# Patient Record
Sex: FEMALE | Race: OTHER | HISPANIC OR LATINO
[De-identification: names, ages, dates, MRNs, and addresses within clinical notes are randomized per-mention and may not be internally consistent; named-entity substitution may affect disease eponyms.]

---

## 2020-05-13 DIAGNOSIS — Z78.9 OTHER SPECIFIED HEALTH STATUS: ICD-10-CM

## 2020-05-13 RX ORDER — LEVONORGESTREL / ETHINYL ESTRADIOL AND ETHINYL ESTRADIOL 150-30(84)
0.15-0.03 &0.01 KIT ORAL
Refills: 0 | Status: ACTIVE | COMMUNITY

## 2020-05-15 ENCOUNTER — APPOINTMENT (OUTPATIENT)
Dept: CARDIOLOGY | Facility: CLINIC | Age: 20
End: 2020-05-15
Payer: MEDICAID

## 2020-05-15 ENCOUNTER — NON-APPOINTMENT (OUTPATIENT)
Age: 20
End: 2020-05-15

## 2020-05-15 VITALS
BODY MASS INDEX: 21.66 KG/M2 | WEIGHT: 138 LBS | HEIGHT: 67 IN | SYSTOLIC BLOOD PRESSURE: 142 MMHG | TEMPERATURE: 98.2 F | DIASTOLIC BLOOD PRESSURE: 70 MMHG

## 2020-05-15 VITALS
TEMPERATURE: 98.2 F | WEIGHT: 138 LBS | HEIGHT: 67 IN | OXYGEN SATURATION: 100 % | DIASTOLIC BLOOD PRESSURE: 68 MMHG | SYSTOLIC BLOOD PRESSURE: 142 MMHG | HEART RATE: 80 BPM | BODY MASS INDEX: 21.66 KG/M2

## 2020-05-15 VITALS — SYSTOLIC BLOOD PRESSURE: 130 MMHG | DIASTOLIC BLOOD PRESSURE: 78 MMHG

## 2020-05-15 DIAGNOSIS — Z78.9 OTHER SPECIFIED HEALTH STATUS: ICD-10-CM

## 2020-05-15 PROCEDURE — 99244 OFF/OP CNSLTJ NEW/EST MOD 40: CPT | Mod: 25

## 2020-05-15 PROCEDURE — 0296T: CPT

## 2020-05-15 PROCEDURE — 93000 ELECTROCARDIOGRAM COMPLETE: CPT | Mod: 59

## 2020-05-15 NOTE — REVIEW OF SYSTEMS
[see HPI] : see HPI [Palpitations] : palpitations [Negative] : Endocrine [Shortness Of Breath] : no shortness of breath [Chest  Pressure] : no chest pressure [Dyspnea on exertion] : not dyspnea during exertion [Lower Ext Edema] : no extremity edema [Leg Claudication] : no intermittent leg claudication [Chest Pain] : no chest pain

## 2020-05-15 NOTE — PHYSICAL EXAM
[General Appearance - Well Developed] : well developed [Well Groomed] : well groomed [Normal Appearance] : normal appearance [No Deformities] : no deformities [General Appearance - Well Nourished] : well nourished [General Appearance - In No Acute Distress] : no acute distress [Normal Conjunctiva] : the conjunctiva exhibited no abnormalities [Normal Oral Mucosa] : normal oral mucosa [Eyelids - No Xanthelasma] : the eyelids demonstrated no xanthelasmas [No Oral Cyanosis] : no oral cyanosis [No Oral Pallor] : no oral pallor [Normal Jugular Venous V Waves Present] : normal jugular venous V waves present [Normal Jugular Venous A Waves Present] : normal jugular venous A waves present [No Jugular Venous Guo A Waves] : no jugular venous guo A waves [Normal] : normal [No Precordial Heave] : no precordial heave was noted [Normal Rate] : normal [Normal S1] : normal S1 [Normal S2] : normal S2 [No Murmur] : no murmurs heard [2+] : right 2+ [No Abnormalities] : the abdominal aorta was not enlarged and no bruit was heard [No Pitting Edema] : no pitting edema present [Exaggerated Use Of Accessory Muscles For Inspiration] : no accessory muscle use [Respiration, Rhythm And Depth] : normal respiratory rhythm and effort [Abdomen Soft] : soft [Auscultation Breath Sounds / Voice Sounds] : lungs were clear to auscultation bilaterally [Abdomen Tenderness] : non-tender [Abdomen Mass (___ Cm)] : no abdominal mass palpated [Abnormal Walk] : normal gait [Nail Clubbing] : no clubbing of the fingernails [Gait - Sufficient For Exercise Testing] : the gait was sufficient for exercise testing [Cyanosis, Localized] : no localized cyanosis [Petechial Hemorrhages (___cm)] : no petechial hemorrhages [Skin Color & Pigmentation] : normal skin color and pigmentation [No Venous Stasis] : no venous stasis [] : no rash [No Xanthoma] : no  xanthoma was observed [No Skin Ulcers] : no skin ulcer [Skin Lesions] : no skin lesions [Oriented To Time, Place, And Person] : oriented to person, place, and time [Mood] : the mood was normal [Affect] : the affect was normal [No Anxiety] : not feeling anxious [FreeTextEntry1] : Equal pulses and blood pressure bilaterally [S3] : no S3 [Right Femoral Bruit] : no bruit heard over the right femoral artery [Right Carotid Bruit] : no bruit heard over the right carotid [Left Carotid Bruit] : no bruit heard over the left carotid [S4] : no S4 [Left Femoral Bruit] : no bruit heard over the left femoral artery

## 2020-05-15 NOTE — DISCUSSION/SUMMARY
[FreeTextEntry1] : 19-year-old female with history of childhood murmur and seen pediatric cardiologist about 3 years ago with exercise treadmill stress test.  No further follow-up was advised.  Family history of hypertension on maternal side but no history of sudden cardiac death or premature coronary artery disease.  According to her she gets anxious when she goes to doctors offices and has borderline blood pressure usually rest of the time blood pressure is less than 130/80.\par Comes in now with intermittent palpitations.  EKG with normal sinus rhythm.  Normal intervals.\par She had recent labs with your office which are not available\par I do not have cardiac evaluation with pediatric cardiologist available for me to review\par Her physical examination is normal.\par Her repeat blood pressure was around 130/80.\par \par Recommendation includes\par 24hour cardiac monitoring\par Echocardiogram for LV ejection fraction and valvular evaluation\par Obtain labs and prior cardiac work-up\par Low caffeine and alcohol intake\par Follow blood pressure closely if remains greater than 130/80 on multiple occasions may need to discuss risk and benefits of using birth control pills\par Lifestyle modification with low-salt diet regular activity exercise\par Stress management\par \par Counseling regarding low saturated fat, salt and carbohydrate intake was reviewed. Active lifestyle and regular. Exercise along with weight management is advised.\par All the above were at length reviewed. Answered all the questions. Thank you very much for this kind referral. Please do not hesitate to give me a call for any question.\par Part of this transcription was done with voice recognition software and phonetically similar errors are common. I apologize for that. Please do not hesitate to call for any questions due to above.\par

## 2020-05-15 NOTE — REASON FOR VISIT
[Consultation] : a consultation regarding [Palpitations] : palpitations [FreeTextEntry1] : 19-year-old young female was seen in the office for intermittent palpitation got worse around the time of her exam when she was stressed and also not sleeping well with continued aggressive exercises.\par The palpitations were intermittent.  Skipping in nature.  No associated dizziness lightheadedness syncope.  No associated chest pain shortness breath PND orthopnea.  No headache visual disturbances focal weakness.\par No increase in caffeine or alcohol intake.\par She is on birth control pills\par Her symptoms have improved since her exams are over and her stress is decreased.\par \par Her medical history mainly significant for\par She has been seen by pediatric cardiologist for childhood murmur and was told it is all normal and no further follow-up was advised\par She had exercise treadmill stress test with pediatric cardiologist 3 years ago which was told to be negative.\par She has family history with mother side essential hypertension\par She has no family history of sudden cardiac death, premature coronary artery disease\par She is non-smoker\par She has no history of hypertension diabetes mellitus myocardial infarction coronary artery cerebrovascular accident peripheral arterial disease rheumatic fever thyroid or Lyme disease

## 2020-05-22 ENCOUNTER — APPOINTMENT (OUTPATIENT)
Dept: CARDIOLOGY | Facility: CLINIC | Age: 20
End: 2020-05-22
Payer: MEDICAID

## 2020-05-22 PROCEDURE — 93306 TTE W/DOPPLER COMPLETE: CPT

## 2020-05-28 PROCEDURE — 0298T: CPT

## 2020-06-08 ENCOUNTER — APPOINTMENT (OUTPATIENT)
Dept: CARDIOLOGY | Facility: CLINIC | Age: 20
End: 2020-06-08
Payer: MEDICAID

## 2020-06-08 VITALS — HEIGHT: 67 IN | BODY MASS INDEX: 21.66 KG/M2 | WEIGHT: 138 LBS

## 2020-06-08 PROCEDURE — 99214 OFFICE O/P EST MOD 30 MIN: CPT | Mod: 95

## 2020-06-08 NOTE — PHYSICAL EXAM
[General Appearance - Well Developed] : well developed [Normal Appearance] : normal appearance [General Appearance - Well Nourished] : well nourished [Well Groomed] : well groomed [No Deformities] : no deformities [FreeTextEntry1] : Equal pulses and blood pressure bilaterally [General Appearance - In No Acute Distress] : no acute distress [] : no respiratory distress [Respiration, Rhythm And Depth] : normal respiratory rhythm and effort [Auscultation Breath Sounds / Voice Sounds] : lungs were clear to auscultation bilaterally [Exaggerated Use Of Accessory Muscles For Inspiration] : no accessory muscle use [Oriented To Time, Place, And Person] : oriented to person, place, and time [Mood] : the mood was normal [Affect] : the affect was normal [No Anxiety] : not feeling anxious

## 2020-06-08 NOTE — ASSESSMENT
[FreeTextEntry1] : Reviewed on June 8, 2020.\par Echocardiogram May 22, 2020.  EF around 60%.  Minimal mitral regurgitation normal left atrium normal pulmonary pressures\par 24-hour event monitoring/Holter monitor showed single PAC otherwise normal sinus rhythm\par Labs in the past and normal TSH\par Prior cardiovascular test which includes echocardiogram, exercise treadmill stress test from 2019 were reviewed\par

## 2020-06-08 NOTE — DISCUSSION/SUMMARY
[FreeTextEntry1] : 19-year-old female with above medical history and active medical problems\par Palpitations related to premature atrial beats.  Stable LV ejection fraction.  Nonexertional.  Stable EKG.  Recent stress test nonischemic and without any arrhythmias.  Stable labs.  Improved symptoms.\par Reviewed with her lifestyle modifications risk and benefits of use of medications like beta-blockers.\par Options reviewed.  She has decided against taking any medications.  She will contact us immediately for any change in her symptoms.\par Low caffeine and alcohol intake\par Follow blood pressure closely if remains greater than 130/80 on multiple occasions may need to discuss risk and benefits of using birth control pills.  She will follow her blood pressure with her primary care physician.\par Lifestyle modification with low-salt diet regular activity exercise\par Stress management\par \par Counseling regarding low saturated fat, salt and carbohydrate intake was reviewed. Active lifestyle and regular. Exercise along with weight management is advised.\par All the above were at length reviewed. Answered all the questions. Thank you very much for this kind referral. Please do not hesitate to give me a call for any question.\par Part of this transcription was done with voice recognition software and phonetically similar errors are common. I apologize for that. Please do not hesitate to call for any questions due to above.\par

## 2020-06-08 NOTE — HISTORY OF PRESENT ILLNESS
[Home] : at home, [unfilled] , at the time of the visit. [Medical Office: ([facility])___] : at the medical office located in  [FreeTextEntry1] : Consent: Patient consented to virtual video visit.\par Time: A total of 25 minutes was spent in review of pertinent medical records, discussion with the patient, evaluation of the patient problem and coordination of the care plan as part of this online visit.\par \par No physical examination was performed as this visit was performed virtually with exceptions as noted below.\par as I judged the risk of COVID-19 cross-contamination to be greater than benefit to the patient conferred by the physical examination.\par \par 19-year-old was seen in 2 way audiovisual visit.\par I have reviewed her Holter monitor, echocardiogram, labs.\par I have reviewed all data from her pediatric cardiologist\par Since last seen with less stress she has occasional palpitations.  Otherwise no chest pain shortness breath PND orthopnea dizziness lightheadedness near syncopal or syncopal event\par She feels well according to her. [Verbal consent obtained from patient] : the patient, [unfilled]

## 2020-08-26 ENCOUNTER — APPOINTMENT (OUTPATIENT)
Dept: CARDIOLOGY | Facility: CLINIC | Age: 20
End: 2020-08-26
Payer: MEDICAID

## 2020-08-26 VITALS
DIASTOLIC BLOOD PRESSURE: 62 MMHG | HEART RATE: 95 BPM | BODY MASS INDEX: 21.5 KG/M2 | WEIGHT: 137 LBS | TEMPERATURE: 98.4 F | SYSTOLIC BLOOD PRESSURE: 120 MMHG | OXYGEN SATURATION: 99 % | HEIGHT: 67 IN

## 2020-08-26 PROCEDURE — 0296T: CPT

## 2020-08-26 PROCEDURE — 99214 OFFICE O/P EST MOD 30 MIN: CPT

## 2020-08-26 RX ORDER — IBUPROFEN 600 MG/1
600 TABLET, FILM COATED ORAL
Qty: 20 | Refills: 0 | Status: DISCONTINUED | COMMUNITY
Start: 2020-03-06 | End: 2020-08-26

## 2020-08-26 RX ORDER — OXYCODONE AND ACETAMINOPHEN 5; 325 MG/1; MG/1
5-325 TABLET ORAL
Qty: 8 | Refills: 0 | Status: DISCONTINUED | COMMUNITY
Start: 2020-03-06 | End: 2020-08-26

## 2020-08-26 RX ORDER — AMOXICILLIN 500 MG/1
500 CAPSULE ORAL
Qty: 21 | Refills: 0 | Status: DISCONTINUED | COMMUNITY
Start: 2020-03-06 | End: 2020-08-26

## 2020-08-26 NOTE — PHYSICAL EXAM
[Normal Appearance] : normal appearance [General Appearance - Well Developed] : well developed [Well Groomed] : well groomed [No Deformities] : no deformities [General Appearance - Well Nourished] : well nourished [General Appearance - In No Acute Distress] : no acute distress [Normal Conjunctiva] : the conjunctiva exhibited no abnormalities [Normal Oral Mucosa] : normal oral mucosa [Eyelids - No Xanthelasma] : the eyelids demonstrated no xanthelasmas [No Oral Pallor] : no oral pallor [No Oral Cyanosis] : no oral cyanosis [Normal Jugular Venous A Waves Present] : normal jugular venous A waves present [No Jugular Venous Guo A Waves] : no jugular venous guo A waves [Normal Jugular Venous V Waves Present] : normal jugular venous V waves present [Exaggerated Use Of Accessory Muscles For Inspiration] : no accessory muscle use [Respiration, Rhythm And Depth] : normal respiratory rhythm and effort [Auscultation Breath Sounds / Voice Sounds] : lungs were clear to auscultation bilaterally [Abdomen Soft] : soft [Abdomen Tenderness] : non-tender [Abdomen Mass (___ Cm)] : no abdominal mass palpated [Abnormal Walk] : normal gait [Nail Clubbing] : no clubbing of the fingernails [Gait - Sufficient For Exercise Testing] : the gait was sufficient for exercise testing [Cyanosis, Localized] : no localized cyanosis [Petechial Hemorrhages (___cm)] : no petechial hemorrhages [Skin Color & Pigmentation] : normal skin color and pigmentation [] : no rash [No Venous Stasis] : no venous stasis [Skin Lesions] : no skin lesions [No Skin Ulcers] : no skin ulcer [No Xanthoma] : no  xanthoma was observed [Oriented To Time, Place, And Person] : oriented to person, place, and time [Affect] : the affect was normal [Mood] : the mood was normal [Normal] : normal [No Anxiety] : not feeling anxious [No Precordial Heave] : no precordial heave was noted [Normal S1] : normal S1 [Normal Rate] : normal [Normal S2] : normal S2 [No Murmur] : no murmurs heard [2+] : left 2+ [No Abnormalities] : the abdominal aorta was not enlarged and no bruit was heard [No Pitting Edema] : no pitting edema present [FreeTextEntry1] : Equal pulses and blood pressure bilaterally [S3] : no S3 [S4] : no S4 [Right Carotid Bruit] : no bruit heard over the right carotid [Left Carotid Bruit] : no bruit heard over the left carotid [Right Femoral Bruit] : no bruit heard over the right femoral artery [Left Femoral Bruit] : no bruit heard over the left femoral artery

## 2020-08-26 NOTE — DISCUSSION/SUMMARY
[FreeTextEntry1] : KAELA ZENDEJAS  is a 19 year F  who presents today Aug 26, 2020 with the above history and the following active issues. \par \par Palpitations. Intermittent in nature. Prior cardiovascular testing reviewed and listed above. Recommend 2 week ZIO monitor. Avoid stimulants. Continue to exercise. \par Low caffeine and alcohol intake\par Follow blood pressure closely if remains greater than 130/80 on multiple occasions may need to discuss risk and benefits of using birth control pills\par Lifestyle modification with low-salt diet regular activity exercise\par Stress management\par \par Red flag symptoms which would warrant sooner emergent evaluation reviewed with the patient. \par Questions and concerns were addressed and answered. \par \par Sincerely,\par \par Hanna Yoder PA-C\par Patients history, testing and plan reviewed with supervising MD: Dr. Patrick Valentino

## 2020-08-26 NOTE — REASON FOR VISIT
[Palpitations] : palpitations [Consultation] : a consultation regarding [FreeTextEntry1] : 19-year-old young female was seen in the office for intermittent palpitations. She feels that the palpitations have continued to occur and are bothersome. Palpitations get worse around the time of her being stressed and also not sleeping well. Palpitations are described as a skipping in nature.  No associated dizziness lightheadedness syncope.  No associated chest pain shortness breath PND orthopnea.  No headache visual disturbances focal weakness.\par No increase in caffeine or alcohol intake.\par She is on birth control pills\par Her symptoms have improved since her exams are over and her stress is decreased.\par \par Her medical history mainly significant for\par She has been seen by pediatric cardiologist for childhood murmur and was told it is all normal and no further follow-up was advised\par She had exercise treadmill stress test with pediatric cardiologist 3 years ago which was told to be negative.\par She has family history with mother side essential hypertension\par She has no family history of sudden cardiac death, premature coronary artery disease\par She is non-smoker\par She has no history of hypertension diabetes mellitus myocardial infarction coronary artery cerebrovascular accident peripheral arterial disease rheumatic fever thyroid or Lyme disease\par \par Echocardiogram May 22, 2020. EF around 60%. Minimal mitral regurgitation normal left atrium normal pulmonary pressures\par 24-hour event monitoring/Holter monitor showed single PAC otherwise normal sinus rhythm\par Labs in the past and normal TSH\par Prior cardiovascular test which includes echocardiogram, exercise treadmill stress test from 2019 were reviewed\par

## 2020-08-26 NOTE — REVIEW OF SYSTEMS
[see HPI] : see HPI [Palpitations] : palpitations [Negative] : Heme/Lymph [Shortness Of Breath] : no shortness of breath [Dyspnea on exertion] : not dyspnea during exertion [Chest  Pressure] : no chest pressure [Chest Pain] : no chest pain [Lower Ext Edema] : no extremity edema [Leg Claudication] : no intermittent leg claudication

## 2020-09-21 ENCOUNTER — APPOINTMENT (OUTPATIENT)
Dept: CARDIOLOGY | Facility: CLINIC | Age: 20
End: 2020-09-21
Payer: MEDICAID

## 2020-09-21 VITALS
SYSTOLIC BLOOD PRESSURE: 100 MMHG | BODY MASS INDEX: 21.19 KG/M2 | WEIGHT: 135 LBS | HEIGHT: 67 IN | DIASTOLIC BLOOD PRESSURE: 68 MMHG

## 2020-09-21 PROCEDURE — 99442: CPT

## 2020-09-21 NOTE — HISTORY OF PRESENT ILLNESS
[FreeTextEntry1] : Consent: Patient consented to 2 way audio visit.  She was at home.  I was at [facility] office.\par Time: A total of 10 minutes was spent in review of pertinent medical records, discussion with the patient, evaluation of the patient problem and coordination of the care plan as part of this online visit.\par as I judged the risk of COVID-19 cross-contamination to be greater than benefit to the patient conferred by the physical examination.\par 19-year-old was seen in 2 way audio visit.\par I have reviewed her Holter monitor and labs.\par Since last seen by me it appears she had increasing palpitations.  She had 2 weeks event monitoring which was reviewed today.\par According to her with this palpitations which are intermittent.  Not associated with any symptoms.  No other aggravating or relieving factors.  Lasts for a few hours and then does not appear for many days.\par  no chest pain shortness breath PND orthopnea dizziness lightheadedness near syncopal or syncopal event\par She continued to be aggressive with her activity and exercise without any significant symptoms.

## 2020-09-21 NOTE — DISCUSSION/SUMMARY
[FreeTextEntry1] : 19-year-old female with above medical history and active medical problems\par Intermittent palpitations.  Associated with premature atrial ventricular beats.  Stable LV ejection fraction.  Nonischemic stress test in the recent past.  Good exercise program without any associated symptoms.  No associated symptoms with her premature atrial ventricular beats.  It is not quality of life altering according to her.  Occurs randomly and does not have it for many days together.\par \par Reviewed with her lifestyle modifications.  risk and benefits of use of medications like beta-blockers.\par Options reviewed.  She has decided against taking any medications.  She will contact us immediately for any change in her symptoms.\par Low caffeine and alcohol intake\par Follow blood pressure closely if remains greater than 130/80 on multiple occasions may need to discuss risk and benefits of using birth control pills.  She will follow her blood pressure with her primary care physician.\par Lifestyle modification with low-salt diet regular activity exercise\par \par Counseling regarding low saturated fat, salt and carbohydrate intake was reviewed. Active lifestyle and regular. Exercise along with weight management is advised.\par All the above were at length reviewed. Answered all the questions. Thank you very much for this kind referral. Please do not hesitate to give me a call for any question.\par Part of this transcription was done with voice recognition software and phonetically similar errors are common. I apologize for that. Please do not hesitate to call for any questions due to above.\par

## 2020-09-21 NOTE — ASSESSMENT
[FreeTextEntry1] : Reviewed on June 8, 2020.\par Echocardiogram May 22, 2020.  EF around 60%.  Minimal mitral regurgitation normal left atrium normal pulmonary pressures\par 24-hour event monitoring/Holter monitor showed single PAC otherwise normal sinus rhythm\par Labs in the past and normal TSH\par Prior cardiovascular test which includes echocardiogram, exercise treadmill stress test from 2019 were reviewed\par \par Reviewed on September 21, 2020\par Recent TSH August 28, 2020 normal\par Minimum heart rate 41 maximum heart rate 169 average heart rate 78.  Normal sinus rhythm.  Occasional second-degree AV type I.  Isolated PACs PVCs noted.  Occasional ventricular couplet.  Symptoms were associated with premature atrial ventricular activities.

## 2020-09-25 PROCEDURE — 0298T: CPT

## 2022-02-15 ENCOUNTER — APPOINTMENT (OUTPATIENT)
Dept: CARDIOLOGY | Facility: CLINIC | Age: 22
End: 2022-02-15
Payer: MEDICAID

## 2022-02-15 ENCOUNTER — NON-APPOINTMENT (OUTPATIENT)
Age: 22
End: 2022-02-15

## 2022-02-15 VITALS
SYSTOLIC BLOOD PRESSURE: 122 MMHG | HEART RATE: 94 BPM | DIASTOLIC BLOOD PRESSURE: 64 MMHG | HEIGHT: 67 IN | WEIGHT: 152 LBS | BODY MASS INDEX: 23.86 KG/M2 | OXYGEN SATURATION: 99 %

## 2022-02-15 DIAGNOSIS — Z00.00 ENCOUNTER FOR GENERAL ADULT MEDICAL EXAMINATION W/OUT ABNORMAL FINDINGS: ICD-10-CM

## 2022-02-15 PROCEDURE — 99214 OFFICE O/P EST MOD 30 MIN: CPT | Mod: 25

## 2022-02-15 PROCEDURE — 93242 EXT ECG>48HR<7D RECORDING: CPT

## 2022-02-15 PROCEDURE — 93000 ELECTROCARDIOGRAM COMPLETE: CPT

## 2022-02-15 PROCEDURE — 99072 ADDL SUPL MATRL&STAF TM PHE: CPT

## 2022-02-15 RX ORDER — ONABOTULINUMTOXINA 100 [USP'U]/1
INJECTION, POWDER, LYOPHILIZED, FOR SOLUTION INTRADERMAL; INTRAMUSCULAR
Refills: 0 | Status: ACTIVE | COMMUNITY

## 2022-02-15 NOTE — REVIEW OF SYSTEMS
[Negative] : Heme/Lymph [SOB] : no shortness of breath [Dyspnea on exertion] : not dyspnea during exertion [Chest Discomfort] : no chest discomfort [Lower Ext Edema] : no extremity edema [Leg Claudication] : no intermittent leg claudication [Palpitations] : palpitations [Orthopnea] : no orthopnea [PND] : no PND [Syncope] : no syncope [Anxiety] : anxiety [Under Stress] : under stress

## 2022-02-15 NOTE — DISCUSSION/SUMMARY
[FreeTextEntry1] : 21-year-old female with above medical history and active medical problems\par Intermittent palpitations.  Associated with premature atrial ventricular beats.  Stable LV ejection fraction.  Nonischemic stress test in the recent past.  Good exercise program without any associated symptoms.  Nonspecific abnormality of the EKG.  Worsening symptoms.  Possible thyroid disorder.  Anxiety and stress playing a role.\par Recommended 7-day event monitor.\par Recommended echocardiogram to evaluate LV ejection fraction wall motion left atrial size mitral regurgitation again considering worsening symptoms.\par Stress management\par Labs which includes thyroid profile.  And management as needed\par \par \par Reviewed with her lifestyle modifications.  risk and benefits of use of medications like beta-blockers.\par Options reviewed.  She has decided against taking any medications.  She will contact us immediately for any change in her symptoms.\par Low caffeine and alcohol intake\par Follow blood pressure closely if remains greater than 130/80 on multiple occasions may need to discuss risk and benefits of using birth control pills.  She will follow her blood pressure with her primary care physician.\par Lifestyle modification with low-salt diet regular activity exercise\par \par Counseling regarding low saturated fat, salt and carbohydrate intake was reviewed. Active lifestyle and regular. Exercise along with weight management is advised.\par All the above were at length reviewed. Answered all the questions. Thank you very much for this kind referral. Please do not hesitate to give me a call for any question.\par Part of this transcription was done with voice recognition software and phonetically similar errors are common. I apologize for that. Please do not hesitate to call for any questions due to above.\par

## 2022-02-15 NOTE — CARDIOLOGY SUMMARY
[___] : [unfilled] [LVEF ___%] : LVEF [unfilled]% [de-identified] : February 15, 2022.  Normal sinus rhythm left atrial abnormality.  Nonspecific T wave changes. [de-identified] : Event monitor.  2020.  2 weeks.  Premature atrial ventricular beats.  Second-degree AV block.  Mobitz 1.  Junctional escape beats. [de-identified] : 2019.  Normal exercise stress test. [de-identified] : May 22, 2022 EF 60% mild mitral regurgitation.  Normal left atrium.

## 2022-02-15 NOTE — PHYSICAL EXAM
[Well Developed] : well developed [Well Nourished] : well nourished [No Acute Distress] : no acute distress [Normal Conjunctiva] : normal conjunctiva [Normal Venous Pressure] : normal venous pressure [No Carotid Bruit] : no carotid bruit [Normal S1, S2] : normal S1, S2 [No Murmur] : no murmur [No Rub] : no rub [No Gallop] : no gallop [Clear Lung Fields] : clear lung fields [Good Air Entry] : good air entry [No Respiratory Distress] : no respiratory distress  [Normal Gait] : normal gait [No Edema] : no edema [No Cyanosis] : no cyanosis [No Clubbing] : no clubbing [No Varicosities] : no varicosities [Moves all extremities] : moves all extremities [Normal Speech] : normal speech [Alert and Oriented] : alert and oriented [Normal memory] : normal memory [No Focal Deficits] : no focal deficits

## 2022-02-15 NOTE — ASSESSMENT
[FreeTextEntry1] : Reviewed on June 8, 2020.\par Echocardiogram May 22, 2020.  EF around 60%.  Minimal mitral regurgitation normal left atrium normal pulmonary pressures\par 24-hour event monitoring/Holter monitor showed single PAC otherwise normal sinus rhythm\par Labs in the past and normal TSH\par Prior cardiovascular test which includes echocardiogram, exercise treadmill stress test from 2019 were reviewed\par \par Reviewed on September 21, 2020\par Recent TSH August 28, 2020 normal\par Minimum heart rate 41 maximum heart rate 169 average heart rate 78.  Normal sinus rhythm.  Occasional second-degree AV type I.  Isolated PACs PVCs noted.  Occasional ventricular couplet.  Symptoms were associated with premature atrial ventricular activities.\par \par Reviewed on February 15, 2022.\par EKG, prior echocardiogram Holter monitor and last labs were reviewed.

## 2022-03-04 PROCEDURE — 93244 EXT ECG>48HR<7D REV&INTERPJ: CPT

## 2022-03-04 PROCEDURE — 99072 ADDL SUPL MATRL&STAF TM PHE: CPT

## 2022-03-15 ENCOUNTER — APPOINTMENT (OUTPATIENT)
Dept: CARDIOLOGY | Facility: CLINIC | Age: 22
End: 2022-03-15
Payer: MEDICAID

## 2022-03-15 VITALS
WEIGHT: 150 LBS | SYSTOLIC BLOOD PRESSURE: 108 MMHG | HEIGHT: 67 IN | DIASTOLIC BLOOD PRESSURE: 60 MMHG | OXYGEN SATURATION: 97 % | HEART RATE: 80 BPM | BODY MASS INDEX: 23.54 KG/M2

## 2022-03-15 PROCEDURE — 93306 TTE W/DOPPLER COMPLETE: CPT

## 2022-03-15 PROCEDURE — 99214 OFFICE O/P EST MOD 30 MIN: CPT

## 2022-03-15 PROCEDURE — 99072 ADDL SUPL MATRL&STAF TM PHE: CPT

## 2022-03-15 NOTE — REVIEW OF SYSTEMS
[Palpitations] : palpitations [Anxiety] : anxiety [Under Stress] : under stress [Negative] : Heme/Lymph [SOB] : no shortness of breath [Dyspnea on exertion] : not dyspnea during exertion [Chest Discomfort] : no chest discomfort [Lower Ext Edema] : no extremity edema [Leg Claudication] : no intermittent leg claudication [Orthopnea] : no orthopnea [PND] : no PND [Syncope] : no syncope

## 2022-03-15 NOTE — HISTORY OF PRESENT ILLNESS
[FreeTextEntry1] : KAELA ZENDEJAS  is a 21 year F  who presents today Mar 15, 2022 in clinical follow-up and to review recent testing. She continues to have palpitations although they have improved with her increasing her exercise. Episodes are short in duration and lasting only seconds. "Feels like I have an extra beat". No associating symptoms. Good exercise capacity. Symptoms have been ongoing for approx 2 years. Increase with anxiety and stress. Recently told of an intermittent thyroid disorder which is monitored by PCP.\par \par Today she denies chest pain, pressure, unusual shortness of breath, lightheadedness, dizziness, near syncope or syncope. \par Her weight diet appetite sleep is stable\par No increase in caffeine alcohol or smoking history.\par \par There is no prior history of myocardial infarction, coronary revascularization, history of ischemic heart disease, or symptomatic congestive heart failure. There is no history of symptomatic arrhythmias including atrial fibrillation.

## 2022-03-15 NOTE — CARDIOLOGY SUMMARY
[___] : [unfilled] [LVEF ___%] : LVEF [unfilled]% [de-identified] : February 15, 2022.  Normal sinus rhythm left atrial abnormality.  Nonspecific T wave changes. [de-identified] : [facility] 7/15/2022 SR 51-164bpm, rare ectopy, PVC burden <1%\par \par Event monitor.  2020.  2 weeks.  Premature atrial ventricular beats.  Second-degree AV block.  Mobitz 1.  Junctional escape beats. [de-identified] : 2019.  Normal exercise stress test. [de-identified] : May 22, 2020 EF 60% mild mitral regurgitation.  Normal left atrium.

## 2022-03-15 NOTE — CARDIOLOGY SUMMARY
[___] : [unfilled] [LVEF ___%] : LVEF [unfilled]% [de-identified] : February 15, 2022.  Normal sinus rhythm left atrial abnormality.  Nonspecific T wave changes. [de-identified] : [facility] 7/15/2022 SR 51-164bpm, rare ectopy, PVC burden <1%\par \par Event monitor.  2020.  2 weeks.  Premature atrial ventricular beats.  Second-degree AV block.  Mobitz 1.  Junctional escape beats. [de-identified] : 2019.  Normal exercise stress test. [de-identified] : May 22, 2020 EF 60% mild mitral regurgitation.  Normal left atrium.

## 2022-03-15 NOTE — PHYSICAL EXAM
[Well Developed] : well developed [Well Nourished] : well nourished [No Acute Distress] : no acute distress [Normal Conjunctiva] : normal conjunctiva [Normal Venous Pressure] : normal venous pressure [No Carotid Bruit] : no carotid bruit [Normal S1, S2] : normal S1, S2 [No Murmur] : no murmur [No Rub] : no rub [No Gallop] : no gallop [Clear Lung Fields] : clear lung fields [Good Air Entry] : good air entry [No Respiratory Distress] : no respiratory distress  [Normal Gait] : normal gait [No Edema] : no edema [No Cyanosis] : no cyanosis [No Clubbing] : no clubbing [No Varicosities] : no varicosities [Moves all extremities] : moves all extremities [No Focal Deficits] : no focal deficits [Normal Speech] : normal speech [Alert and Oriented] : alert and oriented [Normal memory] : normal memory

## 2022-03-15 NOTE — DISCUSSION/SUMMARY
[FreeTextEntry1] : KAELA ZENDEJAS  is a 21 year F  who presents today Mar 15, 2022 with the above history and the following active issues. \par \par Intermittent palpitations.  Occasionally associated with premature atrial ventricular beats.  Stable LV ejection fraction.  Nonischemic stress test in the recent past.  Good exercise program without any associated symptoms. Continued increased exercise program.  \par Stress management. Reviewed role of beta blocker therapy and at this time patient declines. \par Avoid stimulants. Contact the office with worsening symptoms.\par Low caffeine and alcohol intake\par \par Lifestyle modification with low-salt diet regular activity exercise\par \par Red flag symptoms which would warrant sooner emergent evaluation reviewed with the patient. \par Questions and concerns were addressed and answered. \par Limitations of non-invasive testing reviewed\par \par Sincerely,\par \par Hanna Yoder PA-C\par Patients history, testing and plan reviewed with supervising MD: Dr. Aniket Botello

## 2023-03-10 ENCOUNTER — TRANSCRIPTION ENCOUNTER (OUTPATIENT)
Age: 23
End: 2023-03-10

## 2023-05-02 NOTE — PHYSICAL EXAM
[Well Developed] : well developed [Well Nourished] : well nourished [No Acute Distress] : no acute distress [Normal Venous Pressure] : normal venous pressure [No Carotid Bruit] : no carotid bruit [Normal S1, S2] : normal S1, S2 [No Murmur] : no murmur [No Rub] : no rub [No Gallop] : no gallop [Clear Lung Fields] : clear lung fields [Good Air Entry] : good air entry [No Respiratory Distress] : no respiratory distress  [Normal Gait] : normal gait [No Edema] : no edema [No Cyanosis] : no cyanosis [No Clubbing] : no clubbing [No Varicosities] : no varicosities [Moves all extremities] : moves all extremities [Normal Speech] : normal speech [Alert and Oriented] : alert and oriented

## 2023-05-03 ENCOUNTER — APPOINTMENT (OUTPATIENT)
Dept: CARDIOLOGY | Facility: CLINIC | Age: 23
End: 2023-05-03
Payer: MEDICAID

## 2023-05-03 ENCOUNTER — NON-APPOINTMENT (OUTPATIENT)
Age: 23
End: 2023-05-03

## 2023-05-03 VITALS
SYSTOLIC BLOOD PRESSURE: 102 MMHG | HEIGHT: 67 IN | DIASTOLIC BLOOD PRESSURE: 62 MMHG | BODY MASS INDEX: 22.91 KG/M2 | HEART RATE: 82 BPM | OXYGEN SATURATION: 99 % | WEIGHT: 146 LBS

## 2023-05-03 PROCEDURE — 99214 OFFICE O/P EST MOD 30 MIN: CPT | Mod: 25

## 2023-05-03 PROCEDURE — 93000 ELECTROCARDIOGRAM COMPLETE: CPT

## 2023-05-03 NOTE — DISCUSSION/SUMMARY
[FreeTextEntry1] : KAELA ZENDEJAS  is a 22 year F  who presents today  with the above history and the following active issues. \par \par Intermittent palpitations.  Occasionally associated with premature atrial ventricular beats.  Stable LV ejection fraction.  Nonischemic stress test in the recent past.  Good exercise program without any associated symptoms. Continued increased exercise program.  \par Stress management.  Considering asymptomatic state with lifestyle modification no need for any medications at present.\par Avoid stimulants. Contact the office with worsening symptoms.\par Low caffeine and alcohol intake\par \par Lifestyle modification with low-salt diet regular activity exercise\par \par Red flag symptoms which would warrant sooner emergent evaluation reviewed with the patient. \par Questions and concerns were addressed and answered. \par Counseling regarding low saturated fat, salt and carbohydrate intake was reviewed. Active lifestyle and regular. Exercise along with weight management is advised.\par All the above were at length reviewed. Answered all the questions. Thank you very much for this kind referral. Please do not hesitate to give me a call for any question.\par Part of this transcription was done with voice recognition software and phonetically similar errors are common. I apologize for that. Please do not hesitate to call for any questions due to above.\par \par Follow-up as needed\par Sincerely, \par Aniket Botello MD,FACC,ANNA\par \par

## 2023-05-03 NOTE — REVIEW OF SYSTEMS
[Anxiety] : anxiety [Under Stress] : under stress [Negative] : Heme/Lymph [SOB] : no shortness of breath [Dyspnea on exertion] : not dyspnea during exertion [Chest Discomfort] : no chest discomfort [Lower Ext Edema] : no extremity edema [Leg Claudication] : no intermittent leg claudication [Palpitations] : no palpitations [Orthopnea] : no orthopnea [PND] : no PND [Syncope] : no syncope

## 2023-05-03 NOTE — ASSESSMENT
[FreeTextEntry1] : \par Reviewed May 3, 2023.  Labs from 4/21/2023 were reviewed.  Lipid panel not available.

## 2023-05-03 NOTE — CARDIOLOGY SUMMARY
[___] : [unfilled] [LVEF ___%] : LVEF [unfilled]% [de-identified] : February 15, 2022.  Normal sinus rhythm left atrial abnormality.  Nonspecific T wave changes.\par May 3, 2023 normal sinus rhythm [de-identified] : [facility] 7/15/2022 SR 51-164bpm, rare ectopy, PVC burden <1%\par \par Event monitor.  2020.  2 weeks.  Premature atrial ventricular beats.  Second-degree AV block.  Mobitz 1.  Junctional escape beats. [de-identified] : 2019.  Normal exercise stress test. [de-identified] : May 22, 2020 EF 60% mild mitral regurgitation.  Normal left atrium.

## 2023-05-03 NOTE — HISTORY OF PRESENT ILLNESS
[FreeTextEntry1] : KAELA ZENDEJAS  is a 22 year F  who presents today May 3, 2023.\par \par Today she denies chest pain, pressure, unusual shortness of breath, lightheadedness, dizziness, near syncope or syncope. \par Her weight diet appetite sleep is stable\par No increase in caffeine alcohol or smoking history.\par Continue to do aggressive exercises.  Interval training.\par \par \par There is no prior history of myocardial infarction, coronary revascularization, history of ischemic heart disease, or symptomatic congestive heart failure. There is no history of symptomatic arrhythmias including atrial fibrillation.

## 2023-10-20 ENCOUNTER — APPOINTMENT (OUTPATIENT)
Dept: CARDIOLOGY | Facility: CLINIC | Age: 23
End: 2023-10-20
Payer: MEDICAID

## 2023-10-20 VITALS
HEART RATE: 84 BPM | BODY MASS INDEX: 23.54 KG/M2 | DIASTOLIC BLOOD PRESSURE: 68 MMHG | HEIGHT: 67 IN | SYSTOLIC BLOOD PRESSURE: 114 MMHG | WEIGHT: 150 LBS | OXYGEN SATURATION: 99 %

## 2023-10-20 PROCEDURE — 99215 OFFICE O/P EST HI 40 MIN: CPT

## 2023-10-20 RX ORDER — MULTIVITAMIN
CAPSULE ORAL DAILY
Refills: 0 | Status: DISCONTINUED | COMMUNITY
End: 2023-10-20

## 2023-11-27 ENCOUNTER — APPOINTMENT (OUTPATIENT)
Dept: CARDIOLOGY | Facility: CLINIC | Age: 23
End: 2023-11-27
Payer: MEDICAID

## 2023-11-27 PROCEDURE — 93306 TTE W/DOPPLER COMPLETE: CPT

## 2023-12-03 ENCOUNTER — NON-APPOINTMENT (OUTPATIENT)
Age: 23
End: 2023-12-03

## 2023-12-05 ENCOUNTER — APPOINTMENT (OUTPATIENT)
Dept: CARDIOLOGY | Facility: CLINIC | Age: 23
End: 2023-12-05

## 2023-12-19 ENCOUNTER — APPOINTMENT (OUTPATIENT)
Dept: CARDIOLOGY | Facility: CLINIC | Age: 23
End: 2023-12-19
Payer: MEDICAID

## 2023-12-19 VITALS
BODY MASS INDEX: 23.23 KG/M2 | SYSTOLIC BLOOD PRESSURE: 110 MMHG | HEIGHT: 67 IN | WEIGHT: 148 LBS | OXYGEN SATURATION: 99 % | DIASTOLIC BLOOD PRESSURE: 60 MMHG | HEART RATE: 81 BPM

## 2023-12-19 DIAGNOSIS — I49.1 ATRIAL PREMATURE DEPOLARIZATION: ICD-10-CM

## 2023-12-19 DIAGNOSIS — I49.49 OTHER PREMATURE DEPOLARIZATION: ICD-10-CM

## 2023-12-19 PROCEDURE — 99214 OFFICE O/P EST MOD 30 MIN: CPT

## 2023-12-19 RX ORDER — METFORMIN HYDROCHLORIDE 1000 MG/1
1000 TABLET, COATED ORAL DAILY
Refills: 0 | Status: ACTIVE | COMMUNITY

## 2023-12-19 RX ORDER — LORAZEPAM 1 MG/1
1 TABLET ORAL
Refills: 0 | Status: DISCONTINUED | COMMUNITY
End: 2023-12-19
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
## 2023-12-19 NOTE — REVIEW OF SYSTEMS
[Anxiety] : anxiety [Under Stress] : under stress [Negative] : Heme/Lymph [SOB] : no shortness of breath [Dyspnea on exertion] : not dyspnea during exertion [Chest Discomfort] : no chest discomfort [Lower Ext Edema] : no extremity edema [Leg Claudication] : no intermittent leg claudication [Palpitations] : no palpitations [Orthopnea] : no orthopnea [PND] : no PND [Syncope] : no syncope [FreeTextEntry5] : As noted in HPI

## 2023-12-19 NOTE — CARDIOLOGY SUMMARY
[___] : [unfilled] [LVEF ___%] : LVEF [unfilled]% [de-identified] : February 15, 2022.  Normal sinus rhythm left atrial abnormality.  Nonspecific T wave changes. May 3, 2023 normal sinus rhythm October 14, 2023.  Normal sinus rhythm normal intervals.  Outside EKG. [de-identified] : Event monitor.  November 27, 2023.  Normal sinus rhythm.  Average 85.  Rare ectopic beats.   [facility] 7/15/2022 SR 51-164bpm, rare ectopy, PVC burden <1% Event monitor.  2020.  2 weeks.  Premature atrial ventricular beats.  Second-degree AV block.  Mobitz 1.  Junctional escape beats. [de-identified] : 2019.  Normal exercise stress test. [de-identified] : May 22, 2020 EF 60% mild mitral regurgitation.  Normal left atrium. November 27, 2023.  LVEF 60%.
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
## 2024-02-01 ENCOUNTER — OFFICE (OUTPATIENT)
Dept: URBAN - METROPOLITAN AREA CLINIC 8 | Facility: CLINIC | Age: 24
Setting detail: OPHTHALMOLOGY
End: 2024-02-01
Payer: MEDICAID

## 2024-02-01 DIAGNOSIS — H35.413: ICD-10-CM

## 2024-02-01 DIAGNOSIS — D35.2: ICD-10-CM

## 2024-02-01 PROBLEM — H16.223 DRY EYE SYNDROME K SICCA; BOTH EYES: Status: ACTIVE | Noted: 2024-02-01

## 2024-02-01 PROCEDURE — 92202 OPSCPY EXTND ON/MAC DRAW: CPT | Performed by: OPHTHALMOLOGY

## 2024-02-01 PROCEDURE — 92083 EXTENDED VISUAL FIELD XM: CPT | Performed by: OPHTHALMOLOGY

## 2024-02-01 PROCEDURE — 99204 OFFICE O/P NEW MOD 45 MIN: CPT | Performed by: OPHTHALMOLOGY

## 2024-02-01 PROCEDURE — 92133 CPTRZD OPH DX IMG PST SGM ON: CPT | Performed by: OPHTHALMOLOGY

## 2024-02-01 ASSESSMENT — CONFRONTATIONAL VISUAL FIELD TEST (CVF)
OD_FINDINGS: FULL
OS_FINDINGS: FULL

## 2024-02-01 ASSESSMENT — REFRACTION_MANIFEST
OU_VA: 20/20
OD_VA1: 20/20-1
OD_AXIS: 160
OS_VA1: 20/20-1
OD_SPHERE: -2.25
OS_AXIS: 020
OS_SPHERE: -2.75
OD_CYLINDER: -1.25
OS_CYLINDER: -1.00

## 2024-02-01 ASSESSMENT — REFRACTION_AUTOREFRACTION
OD_SPHERE: -1.75
OD_AXIS: 163
OS_AXIS: 018
OS_CYLINDER: -1.00
OS_SPHERE: -2.50
OD_CYLINDER: -1.25

## 2024-02-01 ASSESSMENT — SPHEQUIV_DERIVED
OD_SPHEQUIV: -2.375
OD_SPHEQUIV: -2.875
OS_SPHEQUIV: -3
OS_SPHEQUIV: -3.25

## 2024-02-01 ASSESSMENT — SUPERFICIAL PUNCTATE KERATITIS (SPK)
OS_SPK: 1+
OD_SPK: 1+

## 2024-02-14 ENCOUNTER — APPOINTMENT (OUTPATIENT)
Dept: CARDIOLOGY | Facility: CLINIC | Age: 24
End: 2024-02-14

## 2024-02-28 ENCOUNTER — APPOINTMENT (OUTPATIENT)
Dept: CARDIOLOGY | Facility: CLINIC | Age: 24
End: 2024-02-28
Payer: MEDICAID

## 2024-02-28 DIAGNOSIS — R00.2 PALPITATIONS: ICD-10-CM

## 2024-02-28 DIAGNOSIS — R06.09 OTHER FORMS OF DYSPNEA: ICD-10-CM

## 2024-02-28 PROCEDURE — 93015 CV STRESS TEST SUPVJ I&R: CPT

## 2024-03-07 ENCOUNTER — NON-APPOINTMENT (OUTPATIENT)
Age: 24
End: 2024-03-07

## 2024-03-18 ENCOUNTER — APPOINTMENT (OUTPATIENT)
Dept: NEUROSURGERY | Facility: CLINIC | Age: 24
End: 2024-03-18
Payer: MEDICAID

## 2024-03-18 VITALS
DIASTOLIC BLOOD PRESSURE: 87 MMHG | SYSTOLIC BLOOD PRESSURE: 133 MMHG | HEART RATE: 89 BPM | HEIGHT: 66.5 IN | BODY MASS INDEX: 23.03 KG/M2 | OXYGEN SATURATION: 99 % | TEMPERATURE: 98.4 F | WEIGHT: 145 LBS

## 2024-03-18 PROCEDURE — 99203 OFFICE O/P NEW LOW 30 MIN: CPT

## 2024-03-19 NOTE — DATA REVIEWED
[de-identified] : DATE OF EXAM: 10/11/2023 R. Phys. Name: Yuridia Gallagher R. Phys. Address: [address] [place] [address], [facility] R. Phys. Phone: [phone] MRI-BRAIN AND PITUITARY PRE AND POST IV CONTRAST  HISTORY: E27.0 adrenocortical overactivity R89.1 Abnormal level of hormones   COMPARISON: None available.  TECHNIQUE: Multisequence multiplanar MRI of the brain was obtained on a 1.5 Alma Delia high field wide bore magnet. The examination was performed without and with intravenous contrast. 8 cc Gadoterate Meglumine were administered. High-resolution sequences through the pituitary gland were also obtained.  FINDINGS:  Pituitary gland: There is a 5 mm hypoenhancing focus in the left lateral aspect of the pituitary gland compatible with a microadenoma.. The pituitary infundibulum, suprasellar region and cavernous sinuses are unremarkable.  Brain parenchyma: There is no parenchymal signal abnormality or pathologic enhancement. No mass effect.  Extraaxial spaces: There is a simple appearing pineal cyst measuring up to 1.3 cm.  Ventricles and sulci: Normal for age.  Major cerebral vessels: Blood flow is preserved.  Paranasal sinuses: Clear.  Tympanomastoid air cells: Clear.  Orbits: Unremarkable.  IMPRESSION:   5 mm left-sided pituitary microadenoma. 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  Signed by: Sarah Hernandes MD Signed Date: 10/12/2023 1:29 PM EDT    SIGNED BY: Sarah Hernandes M.D., Ext. [phone] 10/12/2023 01:29 PM  IMPRESSION:   5 mm left-sided pituitary microadenoma. 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

## 2024-03-19 NOTE — REASON FOR VISIT
[New Patient Visit] : a new patient visit [Referred By: _________] : Patient was referred by ИРИНА [FreeTextEntry1] : pituitary abnormality

## 2024-03-19 NOTE — REVIEW OF SYSTEMS
[As Noted in HPI] : as noted in HPI [Negative] : Heme/Lymph [Tension Headache] : tension-type headaches

## 2024-03-19 NOTE — ASSESSMENT
[FreeTextEntry1] : Ms. Diggs presents with above history and imaging.  She is neurologically intact at today's visit.  She continues to be under the care of endocrine for additional evaluation of her actuations in the adrenal hormone.  I have personally reviewed her MRI of the brain that shows a 5 mm pituitary microadenoma with no evidence of compression of the optic chiasm.  In addition there is a small 1.  1.3 cm simple pineal gland cyst.  Given her abnormalities with the adrenals and her new finding of pituitary microadenoma would like to refer her to my colleague Dr. Garsia for surgical consideration.  In the interim she will continue with endocrine for management of her adrenal disorder and she will follow-up with ophthalmology as directed. Patient has been given an opportunity to ask and have their questions answered to their satisfaction. Patient knows to call the office if there are any new or worsening symptoms.     I, Dr. Sina Price, personally performed the evaluation and management (E/M) services for this new patient who presents today. That E/M includes conducting the clinically appropriate interval history &/or exam and establishing a continued plan of care. Today, my BREANNA, Claudia Carter, was here to observe my evaluation and management service for this patient and follow the plan of care established by me going forward.

## 2024-03-19 NOTE — PHYSICAL EXAM
[General Appearance - Alert] : alert [General Appearance - In No Acute Distress] : in no acute distress [Oriented To Time, Place, And Person] : oriented to person, place, and time [Impaired Insight] : insight and judgment were intact [Affect] : the affect was normal [Person] : oriented to person [Place] : oriented to place [Time] : oriented to time [Fluency] : fluency intact [Cranial Nerves Trigeminal (V)] : facial sensation intact symmetrically [Cranial Nerves Facial (VII)] : face symmetrical [Cranial Nerves Glossopharyngeal (IX)] : tongue and palate midline [Cranial Nerves Accessory (XI - Cranial And Spinal)] : head turning and shoulder shrug symmetric [Cranial Nerves Hypoglossal (XII)] : there was no tongue deviation with protrusion [Motor Tone] : muscle tone was normal in all four extremities [Motor Strength] : muscle strength was normal in all four extremities [Abnormal Walk] : normal gait [Balance] : balance was intact

## 2024-03-19 NOTE — HISTORY OF PRESENT ILLNESS
[FreeTextEntry1] : headaches  [de-identified] : KAELA ZENDEJAS is a 23 year old female presents for initial neurosurgical evaluation of new finding of a pituitary microadenoma and pineal gland cyst.  No significant medical history.  Patient is under the care of endocrine for evaluation of abnormalities involving the adrenal glands.  Patient states that she has 3-year history of fatigue which prompted her to be seen and evaluated by endocrine and reveals her labs were found to be abnormal advise ACTH and she is currently under their care for that no elevated prolactin.  She denies any headaches nausea vomiting or vision changes.  She had a formal visual field test with no evidence of compression of the optic chiasm.  She has never had a seizure.  Denies any galactorrhea. She presents with a initial MRI of the brain that shows a 5 mm pituitary microadenoma and a finding of a small pineal gland cyst.  She has no previous intracranial imaging for comparison.

## 2024-04-17 ENCOUNTER — APPOINTMENT (OUTPATIENT)
Dept: NEUROSURGERY | Facility: CLINIC | Age: 24
End: 2024-04-17
Payer: MEDICAID

## 2024-04-17 VITALS
DIASTOLIC BLOOD PRESSURE: 78 MMHG | OXYGEN SATURATION: 99 % | HEIGHT: 66.5 IN | BODY MASS INDEX: 23.03 KG/M2 | HEART RATE: 88 BPM | SYSTOLIC BLOOD PRESSURE: 127 MMHG | WEIGHT: 145 LBS

## 2024-04-17 DIAGNOSIS — E34.8 OTHER SPECIFIED ENDOCRINE DISORDERS: ICD-10-CM

## 2024-04-17 PROCEDURE — 99213 OFFICE O/P EST LOW 20 MIN: CPT

## 2024-04-17 PROCEDURE — 99203 OFFICE O/P NEW LOW 30 MIN: CPT

## 2024-04-29 ENCOUNTER — APPOINTMENT (OUTPATIENT)
Dept: NEUROLOGY | Facility: CLINIC | Age: 24
End: 2024-04-29
Payer: MEDICAID

## 2024-04-29 VITALS
DIASTOLIC BLOOD PRESSURE: 79 MMHG | OXYGEN SATURATION: 98 % | SYSTOLIC BLOOD PRESSURE: 122 MMHG | BODY MASS INDEX: 23.03 KG/M2 | HEIGHT: 66.5 IN | WEIGHT: 145 LBS | HEART RATE: 96 BPM

## 2024-04-29 DIAGNOSIS — Z78.9 OTHER SPECIFIED HEALTH STATUS: ICD-10-CM

## 2024-04-29 PROCEDURE — 99205 OFFICE O/P NEW HI 60 MIN: CPT

## 2024-04-29 NOTE — HISTORY OF PRESENT ILLNESS
[FreeTextEntry1] : Ms. Diggs is a 23-year-old female with history of recent incidentally found 5 mm left pituitary microadenoma and 1.3 cm simple pineal cyst who presents today, referred by Dr. Garsia, for evaluation for BIPSS. She states that she started to experience extreme fatigue and weight gain, prompting extensive workup which revealed elevated cortisol and ACTH and a left pituitary microadenoma.  She currently continues to feel extreme fatigue. She was started on Metformin in October for abnormal OGTT. Denies additional concerns.

## 2024-04-29 NOTE — PHYSICAL EXAM
[FreeTextEntry1] : GENERAL PHYSICAL EXAM: GEN: no distress, normal affect  NEUROLOGICAL EXAM: Mental Status Orientation: alert and oriented to person, place, time, and situation Language: clear and fluent, intact comprehension and repetition. No dysarthria.   Cranial Nerves II: visual fields full to confrontation III, IV, VI: PERRL, EOMI V, VII: facial sensation and movement intact and symmetric VIII: hearing intact IX, X: uvula midline, soft palate elevates normally XI: BL shoulder shrug intact XII: tongue midline   Motor Shoulder abduction: 5 (R), 5 (L) UE flexion: 5 (R), 5 (L) UE extension: 5 (R), 5 (L) Hand : 5 (R), 5 (L) Hip flexion: 5 (R), 5 (L) Knee flexion: 5 (R), 5 (L) Knee extension: 5 (R), 5 (L) Dorsiflexion: 5 (R), 5 (L) Plantar flexion: 5 (R), 5 (L)   Tone and bulk are normal in upper and lower limbs No pronator drift   Sensation Intact to light touch in all 4 EXTs   Coordination Normal FTN bilaterally Able to perform rapid, alternating movements   Gait Normal ambulation with no imbalance Tandem walk intact Negative Romberg

## 2024-04-29 NOTE — CONSULT LETTER
[Dear  ___] : Dear  [unfilled], [Please see my note below.] : Please see my note below. [Consult Closing:] : Thank you very much for allowing me to participate in the care of this patient.  If you have any questions, please do not hesitate to contact me. [Sincerely,] : Sincerely, [FreeTextEntry3] : John Casanova MD Chief, Vascular Neurology and Neurology Service , NeuroEndovascular Surgery Professor of Neurology and Radiology [employer] [employer] at [facility] Director, NeuroEndovascular Surgery [facility]

## 2024-04-29 NOTE — DISCUSSION/SUMMARY
[FreeTextEntry1] : Ms. Diggs is a 23-year-old female with history of recent incidentally found 5 mm left pituitary microadenoma and 1.3 cm simple pineal cyst who presents today, referred by Dr. Garsia, for evaluation for BIPSS. No focal neurological deficits on exam. I explained that the goal of BIPSS is to differentiate between Cushing Disease and Cushing Syndrome, which it is highly sensitive for. We will also hopefully determine which side the excess ACTH is coming from if this is Cushing Disease, though the sensitivity for lateralization is lower. The goals, benefits, alternatives, and risks of the procedure were discussed with the patient and her mother. Risks including, but not limited to stroke, dissection, intracranial hemorrhage, groin hematoma, and contrast reaction were discussed with the patient. There is also a chance of an aberrant connection of one or both of the IPS that we will not be able to cannulate. The patient is in agreement to proceed with the procedure. Will plan to follow up after the procedure. All of the patient and her mother's questions and concerns were addressed.

## 2024-05-05 PROBLEM — E34.8 PINEAL GLAND CYST: Status: ACTIVE | Noted: 2024-03-19

## 2024-05-05 NOTE — REVIEW OF SYSTEMS
[Feeling Tired] : feeling tired [Anxiety] : anxiety [Negative] : Heme/Lymph [de-identified] : Headaches

## 2024-05-05 NOTE — CONSULT LETTER
[Dear  ___] : Dear  [unfilled], [Courtesy Letter:] : I had the pleasure of seeing your patient, [unfilled], in my office today. [Sincerely,] : Sincerely, [FreeTextEntry2] : Sang Garcia  [address] [place] [address] [place] [address] [FreeTextEntry3] : Yannick Garsia MD, PhD, FRCPSC  Attending Neurosurgeon   of Neurosurgery  [facility]  [address], [address] [place] [address]  Office: [phone]  Fax: [phone]

## 2024-05-05 NOTE — REASON FOR VISIT
[New Patient Visit] : a new patient visit [Parent] : parent [FreeTextEntry1] : Pituitary microadenoma associated with elevated ACTH and cortisol.

## 2024-05-16 ENCOUNTER — OUTPATIENT (OUTPATIENT)
Dept: OUTPATIENT SERVICES | Facility: HOSPITAL | Age: 24
LOS: 1 days | End: 2024-05-16
Payer: COMMERCIAL

## 2024-05-16 ENCOUNTER — RESULT REVIEW (OUTPATIENT)
Age: 24
End: 2024-05-16

## 2024-05-16 VITALS
TEMPERATURE: 97 F | RESPIRATION RATE: 22 BRPM | HEIGHT: 66 IN | WEIGHT: 148.15 LBS | DIASTOLIC BLOOD PRESSURE: 60 MMHG | HEART RATE: 98 BPM | OXYGEN SATURATION: 99 % | SYSTOLIC BLOOD PRESSURE: 110 MMHG

## 2024-05-16 DIAGNOSIS — R73.01 IMPAIRED FASTING GLUCOSE: ICD-10-CM

## 2024-05-16 DIAGNOSIS — Z98.890 OTHER SPECIFIED POSTPROCEDURAL STATES: Chronic | ICD-10-CM

## 2024-05-16 DIAGNOSIS — Z13.89 ENCOUNTER FOR SCREENING FOR OTHER DISORDER: ICD-10-CM

## 2024-05-16 DIAGNOSIS — Z01.818 ENCOUNTER FOR OTHER PREPROCEDURAL EXAMINATION: ICD-10-CM

## 2024-05-16 DIAGNOSIS — Z29.9 ENCOUNTER FOR PROPHYLACTIC MEASURES, UNSPECIFIED: ICD-10-CM

## 2024-05-16 DIAGNOSIS — D35.2 BENIGN NEOPLASM OF PITUITARY GLAND: ICD-10-CM

## 2024-05-16 LAB
A1C WITH ESTIMATED AVERAGE GLUCOSE RESULT: 5 % — SIGNIFICANT CHANGE UP (ref 4–5.6)
ALBUMIN SERPL ELPH-MCNC: 4.4 G/DL — SIGNIFICANT CHANGE UP (ref 3.3–5.2)
ALP SERPL-CCNC: 66 U/L — SIGNIFICANT CHANGE UP (ref 40–120)
ALT FLD-CCNC: 34 U/L — HIGH
ANION GAP SERPL CALC-SCNC: 16 MMOL/L — SIGNIFICANT CHANGE UP (ref 5–17)
APTT BLD: 28.3 SEC — SIGNIFICANT CHANGE UP (ref 24.5–35.6)
AST SERPL-CCNC: 26 U/L — SIGNIFICANT CHANGE UP
BASOPHILS # BLD AUTO: 0.03 K/UL — SIGNIFICANT CHANGE UP (ref 0–0.2)
BASOPHILS NFR BLD AUTO: 0.4 % — SIGNIFICANT CHANGE UP (ref 0–2)
BILIRUB SERPL-MCNC: 0.4 MG/DL — SIGNIFICANT CHANGE UP (ref 0.4–2)
BLD GP AB SCN SERPL QL: SIGNIFICANT CHANGE UP
BUN SERPL-MCNC: 10.8 MG/DL — SIGNIFICANT CHANGE UP (ref 8–20)
CALCIUM SERPL-MCNC: 9.9 MG/DL — SIGNIFICANT CHANGE UP (ref 8.4–10.5)
CHLORIDE SERPL-SCNC: 101 MMOL/L — SIGNIFICANT CHANGE UP (ref 96–108)
CO2 SERPL-SCNC: 22 MMOL/L — SIGNIFICANT CHANGE UP (ref 22–29)
CREAT SERPL-MCNC: 0.62 MG/DL — SIGNIFICANT CHANGE UP (ref 0.5–1.3)
EGFR: 127 ML/MIN/1.73M2 — SIGNIFICANT CHANGE UP
EOSINOPHIL # BLD AUTO: 0.03 K/UL — SIGNIFICANT CHANGE UP (ref 0–0.5)
EOSINOPHIL NFR BLD AUTO: 0.4 % — SIGNIFICANT CHANGE UP (ref 0–6)
ESTIMATED AVERAGE GLUCOSE: 97 MG/DL — SIGNIFICANT CHANGE UP (ref 68–114)
GLUCOSE SERPL-MCNC: 101 MG/DL — HIGH (ref 70–99)
HCG SERPL-ACNC: <4 MIU/ML — SIGNIFICANT CHANGE UP
HCT VFR BLD CALC: 38.8 % — SIGNIFICANT CHANGE UP (ref 34.5–45)
HGB BLD-MCNC: 12.9 G/DL — SIGNIFICANT CHANGE UP (ref 11.5–15.5)
IMM GRANULOCYTES NFR BLD AUTO: 0.3 % — SIGNIFICANT CHANGE UP (ref 0–0.9)
INR BLD: 0.84 RATIO — LOW (ref 0.85–1.18)
LYMPHOCYTES # BLD AUTO: 1.2 K/UL — SIGNIFICANT CHANGE UP (ref 1–3.3)
LYMPHOCYTES # BLD AUTO: 17.2 % — SIGNIFICANT CHANGE UP (ref 13–44)
MCHC RBC-ENTMCNC: 29.5 PG — SIGNIFICANT CHANGE UP (ref 27–34)
MCHC RBC-ENTMCNC: 33.2 GM/DL — SIGNIFICANT CHANGE UP (ref 32–36)
MCV RBC AUTO: 88.6 FL — SIGNIFICANT CHANGE UP (ref 80–100)
MONOCYTES # BLD AUTO: 0.29 K/UL — SIGNIFICANT CHANGE UP (ref 0–0.9)
MONOCYTES NFR BLD AUTO: 4.2 % — SIGNIFICANT CHANGE UP (ref 2–14)
MRSA PCR RESULT.: SIGNIFICANT CHANGE UP
NEUTROPHILS # BLD AUTO: 5.4 K/UL — SIGNIFICANT CHANGE UP (ref 1.8–7.4)
NEUTROPHILS NFR BLD AUTO: 77.5 % — HIGH (ref 43–77)
PLATELET # BLD AUTO: 276 K/UL — SIGNIFICANT CHANGE UP (ref 150–400)
POTASSIUM SERPL-MCNC: 4.1 MMOL/L — SIGNIFICANT CHANGE UP (ref 3.5–5.3)
POTASSIUM SERPL-SCNC: 4.1 MMOL/L — SIGNIFICANT CHANGE UP (ref 3.5–5.3)
PROT SERPL-MCNC: 7.8 G/DL — SIGNIFICANT CHANGE UP (ref 6.6–8.7)
PROTHROM AB SERPL-ACNC: 9.4 SEC — LOW (ref 9.5–13)
RBC # BLD: 4.38 M/UL — SIGNIFICANT CHANGE UP (ref 3.8–5.2)
RBC # FLD: 12.2 % — SIGNIFICANT CHANGE UP (ref 10.3–14.5)
S AUREUS DNA NOSE QL NAA+PROBE: SIGNIFICANT CHANGE UP
SODIUM SERPL-SCNC: 139 MMOL/L — SIGNIFICANT CHANGE UP (ref 135–145)
WBC # BLD: 6.97 K/UL — SIGNIFICANT CHANGE UP (ref 3.8–10.5)
WBC # FLD AUTO: 6.97 K/UL — SIGNIFICANT CHANGE UP (ref 3.8–10.5)

## 2024-05-16 PROCEDURE — 85610 PROTHROMBIN TIME: CPT

## 2024-05-16 PROCEDURE — 71046 X-RAY EXAM CHEST 2 VIEWS: CPT

## 2024-05-16 PROCEDURE — G0463: CPT

## 2024-05-16 PROCEDURE — 86901 BLOOD TYPING SEROLOGIC RH(D): CPT

## 2024-05-16 PROCEDURE — 86850 RBC ANTIBODY SCREEN: CPT

## 2024-05-16 PROCEDURE — 36415 COLL VENOUS BLD VENIPUNCTURE: CPT

## 2024-05-16 PROCEDURE — 85025 COMPLETE CBC W/AUTO DIFF WBC: CPT

## 2024-05-16 PROCEDURE — 80053 COMPREHEN METABOLIC PANEL: CPT

## 2024-05-16 PROCEDURE — 85730 THROMBOPLASTIN TIME PARTIAL: CPT

## 2024-05-16 PROCEDURE — 86900 BLOOD TYPING SEROLOGIC ABO: CPT

## 2024-05-16 PROCEDURE — 71046 X-RAY EXAM CHEST 2 VIEWS: CPT | Mod: 26

## 2024-05-16 PROCEDURE — 93010 ELECTROCARDIOGRAM REPORT: CPT

## 2024-05-16 PROCEDURE — 93005 ELECTROCARDIOGRAM TRACING: CPT

## 2024-05-16 PROCEDURE — 87640 STAPH A DNA AMP PROBE: CPT

## 2024-05-16 PROCEDURE — 87641 MR-STAPH DNA AMP PROBE: CPT

## 2024-05-16 PROCEDURE — 83036 HEMOGLOBIN GLYCOSYLATED A1C: CPT

## 2024-05-16 PROCEDURE — 84702 CHORIONIC GONADOTROPIN TEST: CPT

## 2024-05-16 NOTE — H&P PST ADULT - NEGATIVE GASTROINTESTINAL SYMPTOMS
no nausea/no vomiting/no constipation/no change in bowel habits/no flatulence/no abdominal pain/no melena/no hematochezia/no steatorrhea/no jaundice

## 2024-05-16 NOTE — H&P PST ADULT - NSICDXPASTSURGICALHX_GEN_ALL_CORE_FT
PAST SURGICAL HISTORY:  H/O rhinoplasty      PAST SURGICAL HISTORY:  H/O rhinoplasty     History of functional implant, prosthetic, or other device

## 2024-05-16 NOTE — H&P PST ADULT - PROBLEM SELECTOR PLAN 1
scheduled 05/22/2024 for cerebral angiogram/ IPSS with Dr. Casanova.  Patient educated on surgical scrub, labs/diagnostics performed, preadmission instructions, and day of procedure medications- verbalized understanding, teach back method utilized  Stop vitamins/ supplements/ NSAIDs 5 days prior to procedure.

## 2024-05-16 NOTE — H&P PST ADULT - NSICDXFAMILYHX_GEN_ALL_CORE_FT
FAMILY HISTORY:  No pertinent family history     FAMILY HISTORY:  Family history of diabetes mellitus (DM)  FH: breast cancer  FH: heart disease    Grandparent  Still living? Unknown  FH: pulmonary embolism, Age at diagnosis: Age Unknown

## 2024-05-16 NOTE — H&P PST ADULT - NSICDXPASTMEDICALHX_GEN_ALL_CORE_FT
PAST MEDICAL HISTORY:  Benign neoplasm of pituitary gland     H/O Cushing disease     H/O fatigue     Heart palpitations     History of pineal cyst     History of weight gain     Impaired fasting glucose     Microadenoma     PAC (premature atrial contraction)

## 2024-05-16 NOTE — H&P PST ADULT - PROBLEM SELECTOR PLAN 3
cap 4  Moderate Risk,  SCDs ordered for day of procedure.  Surgical team to assess need for VTE prophylaxis cap 7  High risk, SCDs ordered for day of procedure.  Surgical team to assess need for  pharmacological and mechanical measures for VTE prophylaxis

## 2024-05-16 NOTE — H&P PST ADULT - HISTORY OF PRESENT ILLNESS
22yo F patient of Dr. Casanova, [facility] recent incidentally found 5 mm left pituitary microadenoma and 1.3 cm simple pineal cyst, as per DR. Casanova reports. she was referred to Dr. Casanova by Dr. Garsia for evaluation for BIPSS. patient reports extreme fatigue and weight gain, prompting extensive workup, found to have elevated cortisol and ACTH and a left pituitary microadenoma. She was started on Metformin in October for abnormal OGTT. as per DR. Casanova- patient with pituitary microadenoma, cushing disease- recent incidentally found 5 mm left pituitary microadenoma and 1.3 cm simple pineal cyst who presents today, referred by Dr. Garsia, for evaluation for BIPSS, No focal neurological deficits on exam, goal of BIPSS is to differentiate between Cushing Disease and Cushing Syndrome, which it is highly sensitive for. We will also hopefully determine which side the excess ACTH is coming from if this is Cushing Disease, though the sensitivity for lateralization is lower, patient is in agreement to proceed with the procedure. she is scheduled 05/22/2024 for cerebral angiogram/ IPSS with Dr. Casanova. she o/w reports feeling well with no other acute concerns.

## 2024-05-16 NOTE — H&P PST ADULT - ASSESSMENT
24yo F patient of Dr. Casanova, [facility] recent incidentally found 5 mm left pituitary microadenoma and 1.3 cm simple pineal cyst, as per DR. Casanova reports. she was referred to Dr. Casanova by Dr. Garsia for evaluation for BIPSS. patient reports extreme fatigue and weight gain, prompting extensive workup, found to have elevated cortisol and ACTH and a left pituitary microadenoma. She was started on Metformin in October for abnormal OGTT. as per DR. Casanova- patient with pituitary microadenoma, cushing disease- recent incidentally found 5 mm left pituitary microadenoma and 1.3 cm simple pineal cyst who presents today, referred by Dr. Garsia, for evaluation for BIPSS, No focal neurological deficits on exam, goal of BIPSS is to differentiate between Cushing Disease and Cushing Syndrome, which it is highly sensitive for. We will also hopefully determine which side the excess ACTH is coming from if this is Cushing Disease, though the sensitivity for lateralization is lower, patient is in agreement to proceed with the procedure. she is scheduled 2024 for cerebral angiogram/ IPSS with Dr. Casanova. she o/w reports feeling well with no other acute concerns.       CAPRINI SCORE    AGE RELATED RISK FACTORS                                                             [ ] Age 41-60 years                                            (1 Point)  [ ] Age: 61-74 years                                           (2 Points)                 [ ] Age= 75 years                                                (3 Points)             DISEASE RELATED RISK FACTORS                                                       [ ] Edema in the lower extremities                 (1 Point)                     [ ] Varicose veins                                               (1 Point)                                 [ ] BMI > 25 Kg/m2                                            (1 Point)                                  [ ] Serious infection (ie PNA)                            (1 Point)                     [ ] Lung disease ( COPD, Emphysema)            (1 Point)                                                                          [ ] Acute myocardial infarction                         (1 Point)                  [ ] Congestive heart failure (in the previous month)  (1 Point)         [ ] Inflammatory bowel disease                            (1 Point)                  [ ] Central venous access, PICC or Port               (2 points)       (within the last month)                                                                [ ] Stroke (in the previous month)                        (5 Points)    [ ] Previous or present malignancy                       (2 points)                                                                                                                                                         HEMATOLOGY RELATED FACTORS                                                         [ ] Prior episodes of VTE                                     (3 Points)                     [ ] Positive family history for VTE                      (3 Points)                  [ ] Prothrombin 78221 A                                     (3 Points)                     [ ] Factor V Leiden                                                (3 Points)                        [ ] Lupus anticoagulants                                      (3 Points)                                                           [ ] Anticardiolipin antibodies                              (3 Points)                                                       [ ] High homocysteine in the blood                   (3 Points)                                             [ ] Other congenital or acquired thrombophilia      (3 Points)                                                [ ] Heparin induced thrombocytopenia                  (3 Points)                                        MOBILITY RELATED FACTORS  [ ] Bed rest                                                         (1 Point)  [ ] Plaster cast                                                    (2 points)  [ ] Bed bound for more than 72 hours           (2 Points)    GENDER SPECIFIC FACTORS  [ ] Pregnancy or had a baby within the last month   (1 Point)  [ ] Post-partum < 6 weeks                                   (1 Point)  [x ] Hormonal therapy  or oral contraception   (1 Point)  [ ] History of pregnancy complications              (1 point)  [ ] Unexplained or recurrent              (1 Point)    OTHER RISK FACTORS                                           (1 Point)  [x ] BMI >40, smoking, diabetes requiring insulin, chemotherapy  blood transfusions and length of surgery over 2 hours    SURGERY RELATED RISK FACTORS  [ ]  Section within the last month     (1 Point)  [ ] Minor surgery                                                  (1 Point)  [ ] Arthroscopic surgery                                       (2 Points)  [x ] Planned major surgery lasting more            (2 Points)      than 45 minutes     [ ] Elective hip or knee joint replacement       (5 points)       surgery                                                TRAUMA RELATED RISK FACTORS  [ ] Fracture of the hip, pelvis, or leg                       (5 Points)  [ ] Spinal cord injury resulting in paralysis             (5 points)       (in the previous month)    [ ] Paralysis  (less than 1 month)                             (5 Points)  [ ] Multiple Trauma within 1 month                        (5 Points)    Total Score [     4   ]    Caprini Score 0-2: Low Risk, NO VTE prophylaxis required for most patients, encourage ambulation  Caprini Score 3-6: Moderate Risk , pharmacologic VTE prophylaxis is indicated for most patients (in the absence of contraindications)  Caprini Score Greater than or =7: High risk, pharmocologic VTE prophylaxis indicated for most patients (in the absence of contraindications)    OPIOID RISK TOOL    KASI EACH BOX THAT APPLIES AND ADD TOTALS AT THE END    FAMILY HISTORY OF SUBSTANCE ABUSE                 FEMALE         MALE                                                Alcohol                             [  ]1 pt          [  ]3pts                                               Illegal Durgs                     [  ]2 pts        [  ]3pts                                               Rx Drugs                           [  ]4 pts        [  ]4 pts    PERSONAL HISTORY OF SUBSTANCE ABUSE                                                                                          Alcohol                             [  ]3 pts       [  ]3 pts                                               Illegal Drugs                     [  ]4 pts        [  ]4 pts                                               Rx Drugs                           [  ]5 pts        [  ]5 pts    AGE BETWEEN 16-45 YEARS                                      [  ]1 pt         [  ]1 pt    HISTORY OF PREADOLESCENT   SEXUAL ABUSE                                                             [  ]3 pts        [  ]0pts    PSYCHOLOGICAL DISEASE                     ADD, OCD, Bipolar, Schizophrenia        [  ]2 pts         [  ]2 pts                      Depression                                               [  ]1 pt           [  ]1 pt           SCORING TOTAL   (add numbers and type here)              (***)                                     A score of 3 or lower indicated LOW risk for future opioid abuse  A score of 4 to 7 indicated moderate risk for future opioid abuse  A score of 8 or higher indicates a high risk for opioid abuse    OPIOID RISK TOOL    KASI EACH BOX THAT APPLIES AND ADD TOTALS AT THE END    FAMILY HISTORY OF SUBSTANCE ABUSE                 FEMALE         MALE                                                Alcohol                             [  ]1 pt          [  ]3pts                                               Illegal Durgs                     [  ]2 pts        [  ]3pts                                               Rx Drugs                           [  ]4 pts        [  ]4 pts    PERSONAL HISTORY OF SUBSTANCE ABUSE                                                                                          Alcohol                             [  ]3 pts       [  ]3 pts                                               Illegal Drugs                     [  ]4 pts        [  ]4 pts                                               Rx Drugs                           [  ]5 pts        [  ]5 pts    AGE BETWEEN 16-45 YEARS                                      [ x]1 pt         [  ]1 pt    HISTORY OF PREADOLESCENT   SEXUAL ABUSE                                                             [  ]3 pts        [  ]0pts    PSYCHOLOGICAL DISEASE                     ADD, OCD, Bipolar, Schizophrenia        [  ]2 pts         [  ]2 pts                      Depression                                               [  ]1 pt           [  ]1 pt           SCORING TOTAL   (add numbers and type here)              (1)                                     A score of 3 or lower indicated LOW risk for future opioid abuse  A score of 4 to 7 indicated moderate risk for future opioid abuse  A score of 8 or higher indicates a high risk for opioid abuse     24yo F patient of Dr. Casanova, [facility] recent incidentally found 5 mm left pituitary microadenoma and 1.3 cm simple pineal cyst, as per DR. Casanova reports. she was referred to Dr. Casanova by Dr. Garsia for evaluation for BIPSS. patient reports extreme fatigue and weight gain, prompting extensive workup, found to have elevated cortisol and ACTH and a left pituitary microadenoma. She was started on Metformin in October for abnormal OGTT. as per DR. Casanova- patient with pituitary microadenoma, cushing disease- recent incidentally found 5 mm left pituitary microadenoma and 1.3 cm simple pineal cyst who presents today, referred by Dr. Garsia, for evaluation for BIPSS, No focal neurological deficits on exam, goal of BIPSS is to differentiate between Cushing Disease and Cushing Syndrome, which it is highly sensitive for. We will also hopefully determine which side the excess ACTH is coming from if this is Cushing Disease, though the sensitivity for lateralization is lower, patient is in agreement to proceed with the procedure. she is scheduled 2024 for cerebral angiogram/ IPSS with Dr. Casanova. she o/w reports feeling well with no other acute concerns.       CAPRINI SCORE    AGE RELATED RISK FACTORS                                                             [ ] Age 41-60 years                                            (1 Point)  [ ] Age: 61-74 years                                           (2 Points)                 [ ] Age= 75 years                                                (3 Points)             DISEASE RELATED RISK FACTORS                                                       [ ] Edema in the lower extremities                 (1 Point)                     [ ] Varicose veins                                               (1 Point)                                 [ ] BMI > 25 Kg/m2                                            (1 Point)                                  [ ] Serious infection (ie PNA)                            (1 Point)                     [ ] Lung disease ( COPD, Emphysema)            (1 Point)                                                                          [ ] Acute myocardial infarction                         (1 Point)                  [ ] Congestive heart failure (in the previous month)  (1 Point)         [ ] Inflammatory bowel disease                            (1 Point)                  [ ] Central venous access, PICC or Port               (2 points)       (within the last month)                                                                [ ] Stroke (in the previous month)                        (5 Points)    [ ] Previous or present malignancy                       (2 points)                                                                                                                                                         HEMATOLOGY RELATED FACTORS                                                         [ ] Prior episodes of VTE                                     (3 Points)                     [x ] Positive family history for VTE                      (3 Points)                  [ ] Prothrombin 08490 A                                     (3 Points)                     [ ] Factor V Leiden                                                (3 Points)                        [ ] Lupus anticoagulants                                      (3 Points)                                                           [ ] Anticardiolipin antibodies                              (3 Points)                                                       [ ] High homocysteine in the blood                   (3 Points)                                             [ ] Other congenital or acquired thrombophilia      (3 Points)                                                [ ] Heparin induced thrombocytopenia                  (3 Points)                                        MOBILITY RELATED FACTORS  [ ] Bed rest                                                         (1 Point)  [ ] Plaster cast                                                    (2 points)  [ ] Bed bound for more than 72 hours           (2 Points)    GENDER SPECIFIC FACTORS  [ ] Pregnancy or had a baby within the last month   (1 Point)  [ ] Post-partum < 6 weeks                                   (1 Point)  [x ] Hormonal therapy  or oral contraception   (1 Point)  [ ] History of pregnancy complications              (1 point)  [ ] Unexplained or recurrent              (1 Point)    OTHER RISK FACTORS                                           (1 Point)  [x ] BMI >40, smoking, diabetes requiring insulin, chemotherapy  blood transfusions and length of surgery over 2 hours    SURGERY RELATED RISK FACTORS  [ ]  Section within the last month     (1 Point)  [ ] Minor surgery                                                  (1 Point)  [ ] Arthroscopic surgery                                       (2 Points)  [x ] Planned major surgery lasting more            (2 Points)      than 45 minutes     [ ] Elective hip or knee joint replacement       (5 points)       surgery                                                TRAUMA RELATED RISK FACTORS  [ ] Fracture of the hip, pelvis, or leg                       (5 Points)  [ ] Spinal cord injury resulting in paralysis             (5 points)       (in the previous month)    [ ] Paralysis  (less than 1 month)                             (5 Points)  [ ] Multiple Trauma within 1 month                        (5 Points)    Total Score [     7  ]    Caprini Score 0-2: Low Risk, NO VTE prophylaxis required for most patients, encourage ambulation  Caprini Score 3-6: Moderate Risk , pharmacologic VTE prophylaxis is indicated for most patients (in the absence of contraindications)  Caprini Score Greater than or =7: High risk, pharmocologic VTE prophylaxis indicated for most patients (in the absence of contraindications)    OPIOID RISK TOOL    KASI EACH BOX THAT APPLIES AND ADD TOTALS AT THE END    FAMILY HISTORY OF SUBSTANCE ABUSE                 FEMALE         MALE                                                Alcohol                             [  ]1 pt          [  ]3pts                                               Illegal Durgs                     [  ]2 pts        [  ]3pts                                               Rx Drugs                           [  ]4 pts        [  ]4 pts    PERSONAL HISTORY OF SUBSTANCE ABUSE                                                                                          Alcohol                             [  ]3 pts       [  ]3 pts                                               Illegal Drugs                     [  ]4 pts        [  ]4 pts                                               Rx Drugs                           [  ]5 pts        [  ]5 pts    AGE BETWEEN 16-45 YEARS                                      [  ]1 pt         [  ]1 pt    HISTORY OF PREADOLESCENT   SEXUAL ABUSE                                                             [  ]3 pts        [  ]0pts    PSYCHOLOGICAL DISEASE                     ADD, OCD, Bipolar, Schizophrenia        [  ]2 pts         [  ]2 pts                      Depression                                               [  ]1 pt           [  ]1 pt           SCORING TOTAL   (add numbers and type here)              (***)                                     A score of 3 or lower indicated LOW risk for future opioid abuse  A score of 4 to 7 indicated moderate risk for future opioid abuse  A score of 8 or higher indicates a high risk for opioid abuse    OPIOID RISK TOOL    KASI EACH BOX THAT APPLIES AND ADD TOTALS AT THE END    FAMILY HISTORY OF SUBSTANCE ABUSE                 FEMALE         MALE                                                Alcohol                             [  ]1 pt          [  ]3pts                                               Illegal Durgs                     [  ]2 pts        [  ]3pts                                               Rx Drugs                           [  ]4 pts        [  ]4 pts    PERSONAL HISTORY OF SUBSTANCE ABUSE                                                                                          Alcohol                             [  ]3 pts       [  ]3 pts                                               Illegal Drugs                     [  ]4 pts        [  ]4 pts                                               Rx Drugs                           [  ]5 pts        [  ]5 pts    AGE BETWEEN 16-45 YEARS                                      [ x]1 pt         [  ]1 pt    HISTORY OF PREADOLESCENT   SEXUAL ABUSE                                                             [  ]3 pts        [  ]0pts    PSYCHOLOGICAL DISEASE                     ADD, OCD, Bipolar, Schizophrenia        [  ]2 pts         [  ]2 pts                      Depression                                               [  ]1 pt           [  ]1 pt           SCORING TOTAL   (add numbers and type here)              (1)                                     A score of 3 or lower indicated LOW risk for future opioid abuse  A score of 4 to 7 indicated moderate risk for future opioid abuse  A score of 8 or higher indicates a high risk for opioid abuse

## 2024-05-22 ENCOUNTER — TRANSCRIPTION ENCOUNTER (OUTPATIENT)
Age: 24
End: 2024-05-22

## 2024-05-22 ENCOUNTER — APPOINTMENT (OUTPATIENT)
Dept: NEUROLOGY | Facility: HOSPITAL | Age: 24
End: 2024-05-22

## 2024-05-22 ENCOUNTER — OUTPATIENT (OUTPATIENT)
Dept: OUTPATIENT SERVICES | Facility: HOSPITAL | Age: 24
LOS: 1 days | End: 2024-05-22
Payer: COMMERCIAL

## 2024-05-22 DIAGNOSIS — Z98.890 OTHER SPECIFIED POSTPROCEDURAL STATES: Chronic | ICD-10-CM

## 2024-05-22 RX ORDER — METFORMIN HYDROCHLORIDE 850 MG/1
3 TABLET ORAL
Refills: 0 | DISCHARGE

## 2024-05-22 RX ORDER — MILK THISTLE 150 MG
1 CAPSULE ORAL
Refills: 0 | DISCHARGE

## 2024-05-24 PROBLEM — Z86.39 PERSONAL HISTORY OF OTHER ENDOCRINE, NUTRITIONAL AND METABOLIC DISEASE: Chronic | Status: ACTIVE | Noted: 2024-05-16

## 2024-05-24 PROBLEM — R00.2 PALPITATIONS: Chronic | Status: ACTIVE | Noted: 2024-05-16

## 2024-05-24 PROBLEM — Z87.898 PERSONAL HISTORY OF OTHER SPECIFIED CONDITIONS: Chronic | Status: ACTIVE | Noted: 2024-05-16

## 2024-05-24 PROBLEM — I49.1 ATRIAL PREMATURE DEPOLARIZATION: Chronic | Status: ACTIVE | Noted: 2024-05-16

## 2024-05-24 PROBLEM — D36.9 BENIGN NEOPLASM, UNSPECIFIED SITE: Chronic | Status: ACTIVE | Noted: 2024-05-16

## 2024-05-24 PROBLEM — R73.01 IMPAIRED FASTING GLUCOSE: Chronic | Status: ACTIVE | Noted: 2024-05-16

## 2024-06-03 ENCOUNTER — APPOINTMENT (OUTPATIENT)
Dept: NEUROLOGY | Facility: CLINIC | Age: 24
End: 2024-06-03
Payer: MEDICAID

## 2024-06-03 VITALS
OXYGEN SATURATION: 98 % | HEIGHT: 66.5 IN | SYSTOLIC BLOOD PRESSURE: 122 MMHG | BODY MASS INDEX: 23.03 KG/M2 | WEIGHT: 145 LBS | DIASTOLIC BLOOD PRESSURE: 75 MMHG | HEART RATE: 95 BPM

## 2024-06-03 PROBLEM — D35.2 BENIGN NEOPLASM OF PITUITARY GLAND: Chronic | Status: ACTIVE | Noted: 2024-05-16

## 2024-06-03 PROBLEM — Z86.39 PERSONAL HISTORY OF OTHER ENDOCRINE, NUTRITIONAL AND METABOLIC DISEASE: Chronic | Status: ACTIVE | Noted: 2024-05-16

## 2024-06-03 PROCEDURE — G2211 COMPLEX E/M VISIT ADD ON: CPT | Mod: NC

## 2024-06-03 PROCEDURE — 99215 OFFICE O/P EST HI 40 MIN: CPT

## 2024-06-03 RX ORDER — NABUMETONE 500 MG/1
500 TABLET, FILM COATED ORAL
Qty: 10 | Refills: 0 | Status: ACTIVE | COMMUNITY
Start: 2024-06-03 | End: 1900-01-01

## 2024-06-03 NOTE — HISTORY OF PRESENT ILLNESS
[FreeTextEntry1] : Ms. Diggs is a 23-year-old female with history of recent incidentally found 5 mm left pituitary microadenoma and 1.3 cm simple pineal cyst who presents today, referred by Dr. Garsia, for evaluation for BIPSS. She states that she started to experience extreme fatigue and weight gain, prompting extensive workup which revealed elevated cortisol and ACTH and a left pituitary microadenoma.  She had a cerebral angiogram performed on 5/22/24 with bilateral inferior petrosal sinus sampling. Testing showed elevations in ACTH from the left pituitary, confirming central Cushing's disease from the left microadenoma. Three days after angiogram she experienced a scotoma in the periphery of left eye lasting a few minutes. She noted dilation of left pupil at this time. Since this occurred, she has been experiencing daily headaches. She describes the headaches as a tight "band-like" pain around her head and left head pain with associated light sensitivity. She reports improvement of pain with acetaminophen. Groin sites healed without issue.

## 2024-06-03 NOTE — CONSULT LETTER
[Dear  ___] : Dear  [unfilled], [Please see my note below.] : Please see my note below. [Consult Closing:] : Thank you very much for allowing me to participate in the care of this patient.  If you have any questions, please do not hesitate to contact me. [Sincerely,] : Sincerely, [Consult Letter:] : I had the pleasure of evaluating your patient, [unfilled]. [FreeTextEntry3] : John Casanova MD Chief, Vascular Neurology and Neurology Service , NeuroEndovascular Surgery Professor of Neurology and Radiology [facility] [employer] at [facility] Director, NeuroEndovascular Surgery [facility]

## 2024-06-03 NOTE — DATA REVIEWED
[de-identified] : BIPSS Results                   -3 mins      -1 min    0 min    1 min   3 min     5 min   10 min   15 min Right IPS   9.4              11.2     10.6        24.3    48.9      14.3      8.4           21.8 Left IPS     140.0          146.0   174.0      999    > 2000    882      214           242 Peripheral  3.1               5.4      5.2         4.9      8.1          8.7       8.1             8.2  Ine Samples  - 3 mins Right IPS        26.8 Left IPS           295 Peripheral       14.3

## 2024-06-27 DIAGNOSIS — D35.2 BENIGN NEOPLASM OF PITUITARY GLAND: ICD-10-CM

## 2024-06-27 PROCEDURE — 81025 URINE PREGNANCY TEST: CPT

## 2024-06-27 PROCEDURE — C1894: CPT

## 2024-06-27 PROCEDURE — C1887: CPT

## 2024-06-27 PROCEDURE — 36222 PLACE CATH CAROTID/INOM ART: CPT

## 2024-06-27 PROCEDURE — 84146 ASSAY OF PROLACTIN: CPT

## 2024-06-27 PROCEDURE — 82962 GLUCOSE BLOOD TEST: CPT

## 2024-06-27 PROCEDURE — C1769: CPT

## 2024-06-27 PROCEDURE — 36415 COLL VENOUS BLD VENIPUNCTURE: CPT

## 2024-06-27 PROCEDURE — 82024 ASSAY OF ACTH: CPT

## 2024-06-27 PROCEDURE — 82533 TOTAL CORTISOL: CPT

## 2024-07-01 ENCOUNTER — APPOINTMENT (OUTPATIENT)
Dept: NEUROSURGERY | Facility: CLINIC | Age: 24
End: 2024-07-01
Payer: MEDICAID

## 2024-07-01 VITALS
HEART RATE: 87 BPM | DIASTOLIC BLOOD PRESSURE: 73 MMHG | SYSTOLIC BLOOD PRESSURE: 118 MMHG | HEIGHT: 66.5 IN | OXYGEN SATURATION: 100 % | WEIGHT: 145 LBS | BODY MASS INDEX: 23.03 KG/M2

## 2024-07-01 PROBLEM — E24.0 CUSHING DISEASE: Status: ACTIVE | Noted: 2024-04-29

## 2024-07-01 PROBLEM — D35.2 PITUITARY MICROADENOMA: Status: ACTIVE | Noted: 2024-03-19

## 2024-07-01 PROCEDURE — 99214 OFFICE O/P EST MOD 30 MIN: CPT

## 2024-07-16 ENCOUNTER — APPOINTMENT (OUTPATIENT)
Dept: OTOLARYNGOLOGY | Facility: CLINIC | Age: 24
End: 2024-07-16
Payer: MEDICAID

## 2024-07-16 VITALS
HEIGHT: 66 IN | SYSTOLIC BLOOD PRESSURE: 123 MMHG | WEIGHT: 145 LBS | HEART RATE: 80 BPM | DIASTOLIC BLOOD PRESSURE: 80 MMHG | BODY MASS INDEX: 23.3 KG/M2

## 2024-07-16 PROCEDURE — 31231 NASAL ENDOSCOPY DX: CPT

## 2024-07-16 PROCEDURE — 99204 OFFICE O/P NEW MOD 45 MIN: CPT | Mod: 25

## 2024-07-23 ENCOUNTER — APPOINTMENT (OUTPATIENT)
Dept: CARDIOLOGY | Facility: CLINIC | Age: 24
End: 2024-07-23
Payer: MEDICAID

## 2024-07-23 VITALS
SYSTOLIC BLOOD PRESSURE: 128 MMHG | BODY MASS INDEX: 23.78 KG/M2 | WEIGHT: 148 LBS | HEART RATE: 88 BPM | OXYGEN SATURATION: 98 % | HEIGHT: 66 IN | DIASTOLIC BLOOD PRESSURE: 70 MMHG

## 2024-07-23 DIAGNOSIS — E24.0 PITUITARY-DEPENDENT CUSHING'S DISEASE: ICD-10-CM

## 2024-07-23 DIAGNOSIS — I49.49 OTHER PREMATURE DEPOLARIZATION: ICD-10-CM

## 2024-07-23 DIAGNOSIS — D35.2 BENIGN NEOPLASM OF PITUITARY GLAND: ICD-10-CM

## 2024-07-23 DIAGNOSIS — Z01.810 ENCOUNTER FOR PREPROCEDURAL CARDIOVASCULAR EXAMINATION: ICD-10-CM

## 2024-07-23 PROCEDURE — 93000 ELECTROCARDIOGRAM COMPLETE: CPT | Mod: NC

## 2024-07-23 PROCEDURE — 99204 OFFICE O/P NEW MOD 45 MIN: CPT
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
## 2024-07-23 NOTE — DISCUSSION/SUMMARY
[FreeTextEntry1] : KAELA ZENDEJAS  is a 23 year F  who presents today Jul 23, 2024 with the above history and the following active issues.   Intermittent palpitations which are improving. Off propranolol. Continue work with psychologist/psychotherapist Hydration and increasing cardio exercises reviewed.  Stress management.  Considering asymptomatic state with lifestyle modification no need for any medications at present. Avoid stimulants. Contact the office with worsening symptoms. Low caffeine and alcohol intake  Lifestyle modification with low-salt diet regular activity exercise  Preoperative status  date Aug 2, 2024 At present, there are no active cardiac conditions.  No recent unstable coronary syndromes, decompensated heart failure, severe valvular heart disease or significant dysrhythmias.   Baseline functional status is excellent with no new exertional complaints The clinical benefit of the proposed procedure outweighs the associated cardiovascular risk.   Risk not attenuated with further CV testing.   Prior testing as outlined above. Optimized from a cardiovascular perspective.  Red flag symptoms which would warrant sooner emergent evaluation reviewed with the patient.  Questions and concerns were addressed and answered.  Limitations of non-invasive testing reviewed  Sincerely,  Hanna Yoder PA-C Patients history, testing and plan reviewed with supervising MD: Dr. Rivera

## 2024-07-23 NOTE — PHYSICAL EXAM
[Well Developed] : well developed [Well Nourished] : well nourished [No Acute Distress] : no acute distress [Normal S1, S2] : normal S1, S2 [No Murmur] : no murmur [Clear Lung Fields] : clear lung fields [Good Air Entry] : good air entry [No Respiratory Distress] : no respiratory distress  [Normal Gait] : normal gait [No Edema] : no edema [No Cyanosis] : no cyanosis [No Clubbing] : no clubbing [Normal Speech] : normal speech [Alert and Oriented] : alert and oriented

## 2024-07-23 NOTE — CARDIOLOGY SUMMARY
[___] : [unfilled] [LVEF ___%] : LVEF [unfilled]% [de-identified] : February 15, 2022.  Normal sinus rhythm left atrial abnormality.  Nonspecific T wave changes. May 3, 2023 normal sinus rhythm October 14, 2023.  Normal sinus rhythm normal intervals.  Outside EKG. EKG 7/23/2024 SR [de-identified] : Event monitor.  November 27, 2023.  Normal sinus rhythm.  Average 85.  Rare ectopic beats.   [facility] 7/15/2022 SR 51-164bpm, rare ectopy, PVC burden <1% Event monitor.  2020.  2 weeks.  Premature atrial ventricular beats.  Second-degree AV block.  Mobitz 1.  Junctional escape beats. [de-identified] : 2019.  Normal exercise stress test. ETT 2/2024 Dmitriy protocol 9min 47 sec with no evidence of exercise induced ischemia [de-identified] : May 22, 2020 EF 60% mild mitral regurgitation.  Normal left atrium. November 27, 2023.  LVEF 60%. Echo 2/2024 EF 60%

## 2024-08-01 ENCOUNTER — TRANSCRIPTION ENCOUNTER (OUTPATIENT)
Age: 24
End: 2024-08-01

## 2024-08-02 ENCOUNTER — TRANSCRIPTION ENCOUNTER (OUTPATIENT)
Age: 24
End: 2024-08-02

## 2024-08-02 ENCOUNTER — APPOINTMENT (OUTPATIENT)
Dept: OTOLARYNGOLOGY | Facility: HOSPITAL | Age: 24
End: 2024-08-02

## 2024-08-02 ENCOUNTER — INPATIENT (INPATIENT)
Facility: HOSPITAL | Age: 24
LOS: 2 days | Discharge: HOME | DRG: 645 | End: 2024-08-05
Attending: SPECIALIST | Admitting: SPECIALIST
Payer: COMMERCIAL

## 2024-08-02 ENCOUNTER — APPOINTMENT (OUTPATIENT)
Dept: NEUROSURGERY | Facility: HOSPITAL | Age: 24
End: 2024-08-02

## 2024-08-02 ENCOUNTER — RESULT REVIEW (OUTPATIENT)
Age: 24
End: 2024-08-02

## 2024-08-02 VITALS
RESPIRATION RATE: 16 BRPM | SYSTOLIC BLOOD PRESSURE: 126 MMHG | TEMPERATURE: 99 F | OXYGEN SATURATION: 100 % | DIASTOLIC BLOOD PRESSURE: 81 MMHG | HEIGHT: 66 IN | WEIGHT: 147.71 LBS | HEART RATE: 94 BPM

## 2024-08-02 DIAGNOSIS — D35.2 BENIGN NEOPLASM OF PITUITARY GLAND: ICD-10-CM

## 2024-08-02 DIAGNOSIS — Z98.890 OTHER SPECIFIED POSTPROCEDURAL STATES: ICD-10-CM

## 2024-08-02 DIAGNOSIS — Z98.890 OTHER SPECIFIED POSTPROCEDURAL STATES: Chronic | ICD-10-CM

## 2024-08-02 LAB
ANION GAP SERPL CALC-SCNC: 16 MMOL/L — SIGNIFICANT CHANGE UP (ref 5–17)
ANION GAP SERPL CALC-SCNC: 18 MMOL/L — HIGH (ref 5–17)
BLD GP AB SCN SERPL QL: SIGNIFICANT CHANGE UP
BUN SERPL-MCNC: 6.8 MG/DL — LOW (ref 8–20)
BUN SERPL-MCNC: 8.6 MG/DL — SIGNIFICANT CHANGE UP (ref 8–20)
CALCIUM SERPL-MCNC: 8.2 MG/DL — LOW (ref 8.4–10.5)
CALCIUM SERPL-MCNC: 8.3 MG/DL — LOW (ref 8.4–10.5)
CHLORIDE SERPL-SCNC: 100 MMOL/L — SIGNIFICANT CHANGE UP (ref 96–108)
CHLORIDE SERPL-SCNC: 100 MMOL/L — SIGNIFICANT CHANGE UP (ref 96–108)
CO2 SERPL-SCNC: 17 MMOL/L — LOW (ref 22–29)
CO2 SERPL-SCNC: 20 MMOL/L — LOW (ref 22–29)
CREAT SERPL-MCNC: 0.63 MG/DL — SIGNIFICANT CHANGE UP (ref 0.5–1.3)
CREAT SERPL-MCNC: 0.72 MG/DL — SIGNIFICANT CHANGE UP (ref 0.5–1.3)
EGFR: 119 ML/MIN/1.73M2 — SIGNIFICANT CHANGE UP
EGFR: 126 ML/MIN/1.73M2 — SIGNIFICANT CHANGE UP
GAS PNL BLDA: SIGNIFICANT CHANGE UP
GLUCOSE BLDC GLUCOMTR-MCNC: 114 MG/DL — HIGH (ref 70–99)
GLUCOSE BLDC GLUCOMTR-MCNC: 116 MG/DL — HIGH (ref 70–99)
GLUCOSE BLDC GLUCOMTR-MCNC: 122 MG/DL — HIGH (ref 70–99)
GLUCOSE SERPL-MCNC: 117 MG/DL — HIGH (ref 70–99)
GLUCOSE SERPL-MCNC: 143 MG/DL — HIGH (ref 70–99)
POTASSIUM SERPL-MCNC: 3.7 MMOL/L — SIGNIFICANT CHANGE UP (ref 3.5–5.3)
POTASSIUM SERPL-MCNC: 4 MMOL/L — SIGNIFICANT CHANGE UP (ref 3.5–5.3)
POTASSIUM SERPL-SCNC: 3.7 MMOL/L — SIGNIFICANT CHANGE UP (ref 3.5–5.3)
POTASSIUM SERPL-SCNC: 4 MMOL/L — SIGNIFICANT CHANGE UP (ref 3.5–5.3)
SODIUM SERPL-SCNC: 134 MMOL/L — LOW (ref 135–145)
SODIUM SERPL-SCNC: 136 MMOL/L — SIGNIFICANT CHANGE UP (ref 135–145)
T4 AB SER-ACNC: 12.3 UG/DL — HIGH (ref 4.5–12)
TSH SERPL-MCNC: 1.34 UIU/ML — SIGNIFICANT CHANGE UP (ref 0.27–4.2)

## 2024-08-02 PROCEDURE — 88307 TISSUE EXAM BY PATHOLOGIST: CPT | Mod: 26

## 2024-08-02 PROCEDURE — 62165 REMOVE PITUIT TUMOR W/SCOPE: CPT | Mod: 62

## 2024-08-02 PROCEDURE — 99223 1ST HOSP IP/OBS HIGH 75: CPT

## 2024-08-02 PROCEDURE — 15769 GRFG AUTOL SOFT TISS DIR EXC: CPT

## 2024-08-02 PROCEDURE — 88313 SPECIAL STAINS GROUP 2: CPT | Mod: 26

## 2024-08-02 PROCEDURE — 99291 CRITICAL CARE FIRST HOUR: CPT

## 2024-08-02 PROCEDURE — 61781 SCAN PROC CRANIAL INTRA: CPT

## 2024-08-02 PROCEDURE — 88360 TUMOR IMMUNOHISTOCHEM/MANUAL: CPT | Mod: 26

## 2024-08-02 DEVICE — SURGICEL 2 X 3": Type: IMPLANTABLE DEVICE | Status: FUNCTIONAL

## 2024-08-02 DEVICE — GRAFT DURAL MATRX 1X3IN DURGN: Type: IMPLANTABLE DEVICE | Status: FUNCTIONAL

## 2024-08-02 DEVICE — KIT A-LINE 1LUM 20G X 12CM SAFE KIT: Type: IMPLANTABLE DEVICE | Status: FUNCTIONAL

## 2024-08-02 DEVICE — SURGIFLO MATRIX WITH THROMBIN KIT: Type: IMPLANTABLE DEVICE | Status: FUNCTIONAL

## 2024-08-02 DEVICE — SURGIFOAM PAD 8CM X 12.5CM X 10MM (100): Type: IMPLANTABLE DEVICE | Status: FUNCTIONAL

## 2024-08-02 RX ORDER — DEXTROSE 4 G
25 TABLET,CHEWABLE ORAL ONCE
Refills: 0 | Status: DISCONTINUED | OUTPATIENT
Start: 2024-08-02 | End: 2024-08-03

## 2024-08-02 RX ORDER — CEFAZOLIN SODIUM 10 G
2000 VIAL (EA) INJECTION ONCE
Refills: 0 | Status: DISCONTINUED | OUTPATIENT
Start: 2024-08-02 | End: 2024-08-02

## 2024-08-02 RX ORDER — DEXTROSE 4 G
15 TABLET,CHEWABLE ORAL ONCE
Refills: 0 | Status: DISCONTINUED | OUTPATIENT
Start: 2024-08-02 | End: 2024-08-02

## 2024-08-02 RX ORDER — ACETAMINOPHEN 500 MG
975 TABLET ORAL ONCE
Refills: 0 | Status: COMPLETED | OUTPATIENT
Start: 2024-08-02 | End: 2024-08-02

## 2024-08-02 RX ORDER — OXYCODONE HYDROCHLORIDE 30 MG/1
10 TABLET ORAL EVERY 4 HOURS
Refills: 0 | Status: DISCONTINUED | OUTPATIENT
Start: 2024-08-02 | End: 2024-08-05

## 2024-08-02 RX ORDER — BACTERIOSTATIC SODIUM CHLORIDE 0.9 %
1000 VIAL (ML) INJECTION
Refills: 0 | Status: DISCONTINUED | OUTPATIENT
Start: 2024-08-02 | End: 2024-08-03

## 2024-08-02 RX ORDER — OXYCODONE HYDROCHLORIDE 30 MG/1
5 TABLET ORAL EVERY 4 HOURS
Refills: 0 | Status: DISCONTINUED | OUTPATIENT
Start: 2024-08-02 | End: 2024-08-05

## 2024-08-02 RX ORDER — ONDANSETRON HCL/PF 4 MG/2 ML
4 VIAL (ML) INJECTION ONCE
Refills: 0 | Status: DISCONTINUED | OUTPATIENT
Start: 2024-08-02 | End: 2024-08-02

## 2024-08-02 RX ORDER — ONDANSETRON HCL/PF 4 MG/2 ML
4 VIAL (ML) INJECTION EVERY 6 HOURS
Refills: 0 | Status: DISCONTINUED | OUTPATIENT
Start: 2024-08-02 | End: 2024-08-05

## 2024-08-02 RX ORDER — HYDROCORTISONE ACETATE
50 POWDER (GRAM) MISCELLANEOUS EVERY 6 HOURS
Refills: 0 | Status: DISCONTINUED | OUTPATIENT
Start: 2024-08-02 | End: 2024-08-02

## 2024-08-02 RX ORDER — DEXTROSE MONOHYDRATE, SODIUM CHLORIDE, SODIUM LACTATE, CALCIUM CHLORIDE, MAGNESIUM CHLORIDE 1.5; 538; 448; 18.4; 5.08 G/100ML; MG/100ML; MG/100ML; MG/100ML; MG/100ML
1000 SOLUTION INTRAPERITONEAL
Refills: 0 | Status: DISCONTINUED | OUTPATIENT
Start: 2024-08-02 | End: 2024-08-02

## 2024-08-02 RX ORDER — HYDROMORPHONE HCL IN 0.9% NACL 0.2 MG/ML
0.5 PLASTIC BAG, INJECTION (ML) INTRAVENOUS EVERY 6 HOURS
Refills: 0 | Status: DISCONTINUED | OUTPATIENT
Start: 2024-08-02 | End: 2024-08-03

## 2024-08-02 RX ORDER — CHLORHEXIDINE GLUCONATE 500 MG/1
1 CLOTH TOPICAL DAILY
Refills: 0 | Status: DISCONTINUED | OUTPATIENT
Start: 2024-08-02 | End: 2024-08-04

## 2024-08-02 RX ORDER — LABETALOL HCL 200 MG
10 TABLET ORAL
Refills: 0 | Status: DISCONTINUED | OUTPATIENT
Start: 2024-08-02 | End: 2024-08-04

## 2024-08-02 RX ORDER — HYDRALAZINE HYDROCHLORIDE 100 MG/1
10 TABLET ORAL
Refills: 0 | Status: DISCONTINUED | OUTPATIENT
Start: 2024-08-02 | End: 2024-08-04

## 2024-08-02 RX ORDER — FENTANYL CITRATE 1200 UG/1
25 LOZENGE ORAL; TRANSMUCOSAL
Refills: 0 | Status: DISCONTINUED | OUTPATIENT
Start: 2024-08-02 | End: 2024-08-02

## 2024-08-02 RX ORDER — ACETAMINOPHEN 500 MG
1000 TABLET ORAL ONCE
Refills: 0 | Status: COMPLETED | OUTPATIENT
Start: 2024-08-02 | End: 2024-08-03

## 2024-08-02 RX ORDER — LORATADINE 10 MG
17 TABLET,DISINTEGRATING ORAL DAILY
Refills: 0 | Status: DISCONTINUED | OUTPATIENT
Start: 2024-08-02 | End: 2024-08-05

## 2024-08-02 RX ORDER — ACETAMINOPHEN 500 MG
650 TABLET ORAL EVERY 6 HOURS
Refills: 0 | Status: DISCONTINUED | OUTPATIENT
Start: 2024-08-02 | End: 2024-08-05

## 2024-08-02 RX ORDER — DEXTROSE 4 G
12.5 TABLET,CHEWABLE ORAL ONCE
Refills: 0 | Status: DISCONTINUED | OUTPATIENT
Start: 2024-08-02 | End: 2024-08-03

## 2024-08-02 RX ORDER — INSULIN LISPRO 100/ML
VIAL (ML) SUBCUTANEOUS
Refills: 0 | Status: DISCONTINUED | OUTPATIENT
Start: 2024-08-02 | End: 2024-08-03

## 2024-08-02 RX ORDER — CEFAZOLIN SODIUM 10 G
2000 VIAL (EA) INJECTION EVERY 8 HOURS
Refills: 0 | Status: COMPLETED | OUTPATIENT
Start: 2024-08-02 | End: 2024-08-03

## 2024-08-02 RX ORDER — CEFAZOLIN SODIUM 10 G
2000 VIAL (EA) INJECTION EVERY 8 HOURS
Refills: 0 | Status: DISCONTINUED | OUTPATIENT
Start: 2024-08-02 | End: 2024-08-02

## 2024-08-02 RX ORDER — HYDROMORPHONE HCL IN 0.9% NACL 0.2 MG/ML
0.25 PLASTIC BAG, INJECTION (ML) INTRAVENOUS
Refills: 0 | Status: DISCONTINUED | OUTPATIENT
Start: 2024-08-02 | End: 2024-08-02

## 2024-08-02 RX ORDER — SENNOSIDES 8.6 MG/1
2 TABLET ORAL AT BEDTIME
Refills: 0 | Status: DISCONTINUED | OUTPATIENT
Start: 2024-08-02 | End: 2024-08-05

## 2024-08-02 RX ORDER — SODIUM CHLORIDE 0.65 %
1 AEROSOL, SPRAY (ML) NASAL EVERY 4 HOURS
Refills: 0 | Status: DISCONTINUED | OUTPATIENT
Start: 2024-08-02 | End: 2024-08-05

## 2024-08-02 RX ORDER — ACETAMINOPHEN 500 MG
1000 TABLET ORAL ONCE
Refills: 0 | Status: COMPLETED | OUTPATIENT
Start: 2024-08-02 | End: 2024-08-02

## 2024-08-02 RX ORDER — GLUCAGON INJECTION, SOLUTION 0.5 MG/.1ML
1 INJECTION, SOLUTION SUBCUTANEOUS ONCE
Refills: 0 | Status: DISCONTINUED | OUTPATIENT
Start: 2024-08-02 | End: 2024-08-02

## 2024-08-02 RX ORDER — ENOXAPARIN SODIUM 120 MG/.8ML
40 INJECTION SUBCUTANEOUS EVERY 24 HOURS
Refills: 0 | Status: DISCONTINUED | OUTPATIENT
Start: 2024-08-03 | End: 2024-08-05

## 2024-08-02 RX ADMIN — Medication 1000 MILLIGRAM(S): at 19:15

## 2024-08-02 RX ADMIN — Medication 0.5 MILLIGRAM(S): at 16:27

## 2024-08-02 RX ADMIN — Medication 2000 MILLIGRAM(S): at 22:11

## 2024-08-02 RX ADMIN — Medication 75 MILLILITER(S): at 17:19

## 2024-08-02 RX ADMIN — OXYCODONE HYDROCHLORIDE 5 MILLIGRAM(S): 30 TABLET ORAL at 16:00

## 2024-08-02 RX ADMIN — Medication 400 MILLIGRAM(S): at 18:48

## 2024-08-02 RX ADMIN — FENTANYL CITRATE 25 MICROGRAM(S): 1200 LOZENGE ORAL; TRANSMUCOSAL at 12:45

## 2024-08-02 RX ADMIN — FENTANYL CITRATE 25 MICROGRAM(S): 1200 LOZENGE ORAL; TRANSMUCOSAL at 11:45

## 2024-08-02 RX ADMIN — Medication 1 SPRAY(S): at 16:27

## 2024-08-02 RX ADMIN — Medication 975 MILLIGRAM(S): at 06:30

## 2024-08-02 RX ADMIN — Medication 2000 MILLIGRAM(S): at 15:16

## 2024-08-02 RX ADMIN — OXYCODONE HYDROCHLORIDE 5 MILLIGRAM(S): 30 TABLET ORAL at 15:16

## 2024-08-02 RX ADMIN — Medication 0.5 MILLIGRAM(S): at 16:40

## 2024-08-02 RX ADMIN — FENTANYL CITRATE 25 MICROGRAM(S): 1200 LOZENGE ORAL; TRANSMUCOSAL at 12:00

## 2024-08-02 RX ADMIN — Medication 1 SPRAY(S): at 17:19

## 2024-08-02 RX ADMIN — Medication 1 SPRAY(S): at 22:12

## 2024-08-02 NOTE — CONSULT NOTE ADULT - ASSESSMENT
s/p resection of pituitary microadenoma.   in addition to checking cortisol, need to check free cortisol level (the estrogen content of the OCP can cause a false elevation of cortisol due to a rise in cortisol binding globulin) and ACTH level. Will order and follow.

## 2024-08-02 NOTE — CONSULT NOTE ADULT - SUBJECTIVE AND OBJECTIVE BOX
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 States she also was noticing worsening fatigue with weight gain prompting follow up with endocrinology who found her cortisol levels to be elevated. Cerebral angiogram done with bilateral inferior petrosal sinus sampling. Testing has showed elevations in ACTH from the left pituitary, confirming central Cushing's disease from the left microadenoma. Patient reports intermittent headaches, described as pressure, 7/10 in severity, worse with loud noises and bright lights, relieved with NSAIDs. Patient is now s/p endoscopic endonasal transsphenoidal pituitary adenoma resection with Dr. Garsia and Dr. Bain on 8/2/24. Admitted to NeuroICU post-op for closer monitoring.       PAST MEDICAL & SURGICAL HISTORY:  Microadenoma  History of pineal cyst  H/O fatigue  History of weight gain  Heart palpitations  PAC (premature atrial contraction)  H/O Cushing disease  Benign neoplasm of pituitary gland  Impaired fasting glucose  H/O rhinoplasty  History of functional implant, prosthetic, or other device      FAMILY HISTORY:  Family history of diabetes mellitus (DM)  FH: breast cancer  FH: heart disease  FH: pulmonary embolism (Grandparent)      Allergies    No Known Allergies    Intolerances      REVIEW OF SYSTEMS  Negative except as noted in HPI      HOME MEDICATIONS:  Home Medications:  Camrese oral tablet: 1 tab(s) orally once a day (02 Aug 2024 06:12)  MetFORMIN (Eqv-Fortamet) 500 mg oral tablet, extended release: 3 tab(s) orally once a day with dinner (02 Aug 2024 06:12)  Probiotic Formula oral capsule: 1 cap(s) orally once a day (02 Aug 2024 06:12)  turmeric 500 mg oral capsule: 1 cap(s) orally once a day (02 Aug 2024 06:12)  vitamin B12 daily as directed:  (02 Aug 2024 06:12)  vitamin D 50mg daily as directed:  (02 Aug 2024 06:12)      MEDICATIONS:  Antibiotics:  ceFAZolin   IVPB 2000 milliGRAM(s) IV Intermittent every 8 hours    Neuro:  acetaminophen     Tablet .. 650 milliGRAM(s) Oral every 6 hours PRN  fentaNYL    Injectable 25 MICROGram(s) IV Push every 5 minutes PRN  HYDROmorphone  Injectable 0.25 milliGRAM(s) IV Push every 10 minutes PRN  HYDROmorphone  Injectable 0.5 milliGRAM(s) IV Push every 6 hours PRN  ondansetron Injectable 4 milliGRAM(s) IV Push every 6 hours PRN  ondansetron Injectable 4 milliGRAM(s) IV Push once PRN  oxyCODONE    IR 5 milliGRAM(s) Oral every 4 hours PRN  oxyCODONE    IR 10 milliGRAM(s) Oral every 4 hours PRN    Anticoagulation:    OTHER:  dextrose 50% Injectable 25 Gram(s) IV Push once  dextrose 50% Injectable 12.5 Gram(s) IV Push once  dextrose 50% Injectable 25 Gram(s) IV Push once  dextrose Oral Gel 15 Gram(s) Oral once PRN  glucagon  Injectable 1 milliGRAM(s) IntraMuscular once  hydrALAZINE Injectable 10 milliGRAM(s) IV Push every 2 hours PRN  insulin lispro (ADMELOG) corrective regimen sliding scale   SubCutaneous three times a day before meals  labetalol Injectable 10 milliGRAM(s) IV Push every 2 hours PRN  polyethylene glycol 3350 17 Gram(s) Oral daily  senna 2 Tablet(s) Oral at bedtime  sodium chloride 0.65% Nasal 1 Spray(s) Both Nostrils every 4 hours    IVF:  dextrose 5%. 1000 milliLiter(s) IV Continuous <Continuous>  dextrose 5%. 1000 milliLiter(s) IV Continuous <Continuous>  sodium chloride 0.9%. 1000 milliLiter(s) IV Continuous <Continuous>      Vital Signs Last 24 Hrs  T(C): 36.4 (02 Aug 2024 11:00), Max: 37.1 (02 Aug 2024 06:12)  T(F): 97.5 (02 Aug 2024 11:00), Max: 98.8 (02 Aug 2024 06:12)  HR: 83 (02 Aug 2024 11:30) (83 - 103)  BP: 136/76 (02 Aug 2024 11:30) (124/95 - 138/87)  BP(mean): 93 (02 Aug 2024 11:30) (93 - 103)  RR: 14 (02 Aug 2024 11:30) (14 - 18)  SpO2: 100% (02 Aug 2024 11:30) (100% - 100%)    Parameters below as of 02 Aug 2024 11:30  Patient On (Oxygen Delivery Method): room air      PHYSICAL EXAM:  GENERAL: NAD, well-groomed  HEAD:  Atraumatic, normocephalic  GIANA COMA SCORE: E-4 V-5 M-6 = 15  MENTAL STATUS: AAO x3; Waking from anesthesia; Opens eyes spontaneously; Appropriately conversant without aphasia; following simple commands  CRANIAL NERVES: visual fields full to confrontation, PERRL. EOMI without nystagmus. Face symmetric, tongue midline. Hearing grossly intact. Speech clear.   MOTOR: strength 5/5 b/l upper and lower extremities  SENSATION: grossly intact to light touch all extremities  CHEST/LUNG: Non-labored breathing on room air   HEART: Regular rate and rhythm  SKIN: Warm, dry  
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 States she experienced a traumatic event in 07/2023, was suffering from worsening anxiety. States she also was noticing worsening fatigue with weight gain prompting follow up with endocrinology who found her cortisol levels to be elevated. Cerebral angiogram done with bilateral inferior petrosal sinus sampling. Testing has showed elevations in ACTH from the left pituitary, confirming central Cushing's disease from the left microadenoma. Patient reports intermittent headaches, described as pressure, 7/10 in severity, worse with loud noises and bright lights, relieved with NSAIDs. Denies fevers, chills, nausea or vomiting. Patient is scheduled for endoscopic endonasal transsphenoidal pituitary adenoma resection, transsphenoidal resection, pituitary microadenoma, possible nasoseptal flap, possible free mucosal graft (CECILY) on 8/2/24 with Dr. Garsia, secondary surgeon Dr. Bain.  (26 Jul 2024 09:51)  pt. is seen s/p TSS  h/o cushing's syndrome, presenting with fatigue and weight gain. Evaluated by an endocrinologist not in our group (Dr. Madden) and diagnosed with ACTH dependent hypercortisolemia; MRI revealed a 5 mm microadenoma. IPSS performed which suggested lateralization to the side of the adenoma.  Menses are intermittent, and pt. is on an estrogen containing OCP  also recent severe psychological stress   had been on metformin without history of diabetes      PAST MEDICAL & SURGICAL HISTORY:  Microadenoma      History of pineal cyst      H/O fatigue      History of weight gain      Heart palpitations      PAC (premature atrial contraction)      H/O Cushing disease      Benign neoplasm of pituitary gland      Impaired fasting glucose      H/O rhinoplasty      History of functional implant, prosthetic, or other device            MEDICATIONS  (STANDING):  acetaminophen   IVPB .. 1000 milliGRAM(s) IV Intermittent once  ceFAZolin  Injectable. 2000 milliGRAM(s) IV Push every 8 hours  dextrose 5%. 1000 milliLiter(s) (100 mL/Hr) IV Continuous <Continuous>  dextrose 5%. 1000 milliLiter(s) (50 mL/Hr) IV Continuous <Continuous>  dextrose 50% Injectable 12.5 Gram(s) IV Push once  dextrose 50% Injectable 25 Gram(s) IV Push once  dextrose 50% Injectable 25 Gram(s) IV Push once  glucagon  Injectable 1 milliGRAM(s) IntraMuscular once  insulin lispro (ADMELOG) corrective regimen sliding scale   SubCutaneous three times a day before meals  polyethylene glycol 3350 17 Gram(s) Oral daily  senna 2 Tablet(s) Oral at bedtime  sodium chloride 0.65% Nasal 1 Spray(s) Both Nostrils every 4 hours  sodium chloride 0.9%. 1000 milliLiter(s) (75 mL/Hr) IV Continuous <Continuous>    MEDICATIONS  (PRN):  acetaminophen     Tablet .. 650 milliGRAM(s) Oral every 6 hours PRN Mild Pain (1 - 3)  dextrose Oral Gel 15 Gram(s) Oral once PRN Blood Glucose LESS THAN 70 milliGRAM(s)/deciliter  hydrALAZINE Injectable 10 milliGRAM(s) IV Push every 2 hours PRN SBP > 150mm Hg  HYDROmorphone  Injectable 0.5 milliGRAM(s) IV Push every 6 hours PRN Severe Pain (7 - 10)  labetalol Injectable 10 milliGRAM(s) IV Push every 2 hours PRN Systolic blood pressure >150  ondansetron Injectable 4 milliGRAM(s) IV Push every 6 hours PRN Nausea and/or Vomiting  oxyCODONE    IR 5 milliGRAM(s) Oral every 4 hours PRN Moderate Pain (4 - 6)  oxyCODONE    IR 10 milliGRAM(s) Oral every 4 hours PRN Severe Pain (7 - 10)      Allergies    No Known Allergies    Intolerances          PHYSICAL EXAM:    Vital Signs Last 24 Hrs  T(C): 36.8 (02 Aug 2024 14:08), Max: 37.1 (02 Aug 2024 06:12)  T(F): 98.3 (02 Aug 2024 14:08), Max: 98.8 (02 Aug 2024 06:12)  HR: 97 (02 Aug 2024 14:08) (80 - 103)  BP: 125/78 (02 Aug 2024 14:08) (124/78 - 139/81)  BP(mean): 90 (02 Aug 2024 14:08) (90 - 103)  RR: 14 (02 Aug 2024 14:08) (14 - 18)  SpO2: 99% (02 Aug 2024 14:08) (98% - 100%)    Parameters below as of 02 Aug 2024 14:08  Patient On (Oxygen Delivery Method): room air        General appearance: Well developed, well nourished. Not overtly cushingoid    Eyes: Pupils equal     Neck: Trachea midline. No thyroid enlargement.    Lungs: Normal respiratory excursion. Lungs clear.    CV: Regular cardiac rhythm.    Musculoskeletal: No cyanosis, clubbing, or edema.     Skin: Warm and dry    Neuro: Cranial nerves intact.     Psych: Normal affect, good judgement.        POCT Blood Glucose.: 114 mg/dL (08-02-24 @ 11:06)  POCT Blood Glucose.: 116 mg/dL (08-02-24 @ 06:26)

## 2024-08-02 NOTE — CONSULT NOTE ADULT - ASSESSMENT
Assessment:  23F PMHx of pituitary microadenoma, cushings, heart palpitations, s/p cerebral angio 5/2024, anxiety, s/p elective transsphenoidal pituitary adenomectomy with free mucosal graft, POD#0.    Plan:  Neuro:  - Q1 hour Neuro checks, Q1 hour Vitals  - HOB 30 degrees, Neck midline position  - MRI Brain w/wo  - Pain control prn; avoid oversedation  - Skull base precautions: No straws, nasal swabs, incentive spirometry, no nasal cannula, CPAP/BIPAP/High flow, NGT, nose blowing  - Activity: PT/OT  	  CV:  - SBP Goal < 150, Cardene gtt and PRN medications as needed  - PRN hydralazine/labetalol    Pulm:  - Supplemental O2 PRN to maintain Spo2>92%    GI:  - ADAT  - Bowel regimen: Senna, Miralax   	  Gu:  - Zuleta  - DI watch, I&O Q1 hour  - Monitor electrolytes, replete prn  - NS @ 75      Heme:  - DVT ppx: SCDs, hold chemoppx in immediate post-op period   	  ID:  - Monitor WBC and Temperature  - Post-op Ancef x24hrs    Endo  - Monitor BGL, maintain <180  - MILA   - Cortisol PM levels tonight and AM cortisol levels  - Endo consult    Discussed with Dr. Cerda

## 2024-08-02 NOTE — BRIEF OPERATIVE NOTE - NSICDXBRIEFPOSTOP_GEN_ALL_CORE_FT
POST-OP DIAGNOSIS:  S/P selective transsphenoidal pituitary adenomectomy 02-Aug-2024 10:48:45  Charis Franklin

## 2024-08-02 NOTE — DISCHARGE NOTE PROVIDER - CARE PROVIDER_API CALL
Yannick Garsia  Neurosurgery  [address] [place] [id number]  Phone: [phone]  Fax: [phone]  Follow Up Time:    Yannick Garsia  Neurosurgery  [address] [place] [id number]  Phone: [phone]  Fax: [phone]  Follow Up Time: 1 week

## 2024-08-02 NOTE — DISCHARGE NOTE PROVIDER - NSDCMRMEDTOKEN_GEN_ALL_CORE_FT
Camrese oral tablet: 1 tab(s) orally once a day  MetFORMIN (Eqv-Fortamet) 500 mg oral tablet, extended release: 3 tab(s) orally once a day with dinner  mupirocin 2% topical ointment: Apply topically to affected area 2 times a day Apply small amount inside each nostril two times a day for 5 days leading up to procedure (7/28/24- 8/1/24)  Probiotic Formula oral capsule: 1 cap(s) orally once a day  turmeric 500 mg oral capsule: 1 cap(s) orally once a day  vitamin B12 daily as directed:   vitamin D 50mg daily as directed:    acetaminophen 325 mg oral tablet: 2 tab(s) orally every 6 hours As needed Mild Pain (1 - 3)  Camrese oral tablet: 1 tab(s) orally once a day  MetFORMIN (Eqv-Fortamet) 500 mg oral tablet, extended release: 3 tab(s) orally once a day with dinner  Probiotic Formula oral capsule: 1 cap(s) orally once a day  sodium chloride 0.65% nasal spray: 1 spray(s) nasal every 2 hours  vitamin B12 daily as directed:   vitamin D 50mg daily as directed:

## 2024-08-02 NOTE — DISCHARGE NOTE PROVIDER - HOSPITAL COURSE
22 y/o F with PMHx of anxiety, Cushings, pituitary adenoma, palpitations, hyperglycemia admitted to hospital on 8/2/2024 for elective transsphenoidal resection of pituitary microadenoma with free mucosal graft by Dr. Garsia and Dr. Bain. Patient tolerated procedure well and progressed appropriately in the neuroICU then finished recovering on floor when appropriate. Endocrinology, neurosurgery, ENT, physical therapy and occupational therapy followed this patient's course. Currently tolerating regular diet, voiding independently, having bowel movements and ambulating. On DATE, pt was deemed medically and surgically stable for discharge. Discharged on DATE with home medications, incentive spirometer and pain medication to follow-up with Dr. Garsia in 7-14 days. Upon discharge, medications, hospital course and instructions discussed with pt prior to leaving. 24 y/o F with PMHx of anxiety, Cushings, pituitary adenoma, palpitations, hyperglycemia admitted to hospital on 8/2/2024 for elective transsphenoidal resection of pituitary microadenoma with free mucosal graft by Dr. Garsia and Dr. Bain. Patient tolerated procedure well and progressed appropriately in the neuroICU then finished recovering on floor when appropriate. Endocrinology, neurosurgery, ENT, physical therapy and occupational therapy followed this patient's course. Currently tolerating regular diet, voiding independently, having bowel movements and ambulating. On 8/5/2024, pt was deemed medically and surgically stable for discharge. Discharged on 8/5/24 to follow-up with Dr. Garsia in 7-14 days. Upon discharge, medications, hospital course and instructions discussed with pt prior to leaving.

## 2024-08-02 NOTE — DISCHARGE NOTE PROVIDER - NSDCFUSCHEDAPPT_GEN_ALL_CORE_FT
John Casanova Physician [facility]  NEUROLOGY [address] [place] [address]  Scheduled Appointment: 08/22/2024     Luiz Bain Physician [facility]  OTOLARYNG [address] [facility]  Scheduled Appointment: 08/13/2024    John Casanova [facility]  NEUROLOGY [address]  Scheduled Appointment: 08/22/2024

## 2024-08-02 NOTE — DISCHARGE NOTE PROVIDER - NSDCCPTREATMENT_GEN_ALL_CORE_FT
PRINCIPAL PROCEDURE  Procedure: Excision, lesion, pituitary, transsphenoidal  Findings and Treatment: pituitary microadenoma resection with free mucosal graft

## 2024-08-02 NOTE — PROGRESS NOTE ADULT - SUBJECTIVE AND OBJECTIVE BOX
ENT ISSUE/POD: s/p endonasal transphenoidal resection of pituitary microadenoma POD0    Interval HPI: Pt reports mild head pressure but tolerable. Denies vision changes. Had a bout of bloody mucous from the nose when she sat up but no salty metallic taste      PAST MEDICAL & SURGICAL HISTORY:  Microadenoma      History of pineal cyst      H/O fatigue      History of weight gain      Heart palpitations      PAC (premature atrial contraction)      H/O Cushing disease      Benign neoplasm of pituitary gland      Impaired fasting glucose      H/O rhinoplasty      History of functional implant, prosthetic, or other device        Allergies    No Known Allergies    Intolerances      MEDICATIONS  (STANDING):  acetaminophen   IVPB .. 1000 milliGRAM(s) IV Intermittent once  ceFAZolin  Injectable. 2000 milliGRAM(s) IV Push every 8 hours  dextrose 5%. 1000 milliLiter(s) (50 mL/Hr) IV Continuous <Continuous>  dextrose 5%. 1000 milliLiter(s) (100 mL/Hr) IV Continuous <Continuous>  dextrose 50% Injectable 25 Gram(s) IV Push once  dextrose 50% Injectable 12.5 Gram(s) IV Push once  dextrose 50% Injectable 25 Gram(s) IV Push once  glucagon  Injectable 1 milliGRAM(s) IntraMuscular once  insulin lispro (ADMELOG) corrective regimen sliding scale   SubCutaneous three times a day before meals  polyethylene glycol 3350 17 Gram(s) Oral daily  senna 2 Tablet(s) Oral at bedtime  sodium chloride 0.65% Nasal 1 Spray(s) Both Nostrils every 4 hours  sodium chloride 0.9%. 1000 milliLiter(s) (75 mL/Hr) IV Continuous <Continuous>    MEDICATIONS  (PRN):  acetaminophen     Tablet .. 650 milliGRAM(s) Oral every 6 hours PRN Mild Pain (1 - 3)  dextrose Oral Gel 15 Gram(s) Oral once PRN Blood Glucose LESS THAN 70 milliGRAM(s)/deciliter  hydrALAZINE Injectable 10 milliGRAM(s) IV Push every 2 hours PRN SBP > 150mm Hg  HYDROmorphone  Injectable 0.5 milliGRAM(s) IV Push every 6 hours PRN Severe Pain (7 - 10)  labetalol Injectable 10 milliGRAM(s) IV Push every 2 hours PRN Systolic blood pressure >150  ondansetron Injectable 4 milliGRAM(s) IV Push every 6 hours PRN Nausea and/or Vomiting  oxyCODONE    IR 5 milliGRAM(s) Oral every 4 hours PRN Moderate Pain (4 - 6)  oxyCODONE    IR 10 milliGRAM(s) Oral every 4 hours PRN Severe Pain (7 - 10)      ROS:   ENT: all negative except as noted in HPI   Pulm: denies SOB, cough, hemoptysis  Neuro: denies numbness/tingling, loss of sensation  Endo: denies heat/cold intolerance, excessive sweating      Vital Signs Last 24 Hrs  T(C): 36.8 (02 Aug 2024 14:08), Max: 37.1 (02 Aug 2024 06:12)  T(F): 98.3 (02 Aug 2024 14:08), Max: 98.8 (02 Aug 2024 06:12)  HR: 97 (02 Aug 2024 14:08) (80 - 103)  BP: 125/78 (02 Aug 2024 14:08) (124/78 - 139/81)  BP(mean): 90 (02 Aug 2024 14:08) (90 - 103)  RR: 14 (02 Aug 2024 14:08) (14 - 18)  SpO2: 99% (02 Aug 2024 14:08) (98% - 100%)    Parameters below as of 02 Aug 2024 14:08  Patient On (Oxygen Delivery Method): room air       PHYSICAL EXAM:  Gen: NAD  Skin: No rashes, bruises, or lesions  Head: Normocephalic, Atraumatic  Face: no edema, erythema, or fluctuance. Parotid glands soft without mass  Eyes: no scleral injection  Nose: Nares bilaterally patent scant bloody mucous from b/l nares, no active drainage  Mouth: No Stridor / Drooling / Trismus.  Mucosa moist, tongue/uvula midline, oropharynx clear  Neck: Flat, supple, no lymphadenopathy, trachea midline, no masses  Lymphatic: No lymphadenopathy  Resp: breathing easily, no stridor  Neuro: facial nerve intact, no facial droop

## 2024-08-02 NOTE — BRIEF OPERATIVE NOTE - NSICDXBRIEFPROCEDURE_GEN_ALL_CORE_FT
PROCEDURES:  Excision, lesion, pituitary, transsphenoidal 02-Aug-2024 10:47:20 pituitary microadenoma resection with free mucosal graft Charis Franklin

## 2024-08-02 NOTE — DISCHARGE NOTE PROVIDER - NSDCCPCAREPLAN_GEN_ALL_CORE_FT
PRINCIPAL DISCHARGE DIAGNOSIS  Diagnosis: Pituitary microadenoma  Assessment and Plan of Treatment: Diet: Please hold aspirin, NSAIDs, goody's powder, anticoagulation, vitamin E, turmeric/arriola lattes, cinnamon pills, fish oil, ginseng, and all other supplements until cleared by your neurosurgeon on an outpatient follow up appointment  Pain: It is common to have a headache or incisional pain after surgery. You may take the pain medication we prescribe, or over the counter Tylenol. Do not exceed 4000mg of tylenol in 24 hours. Pain medication, especially narcotics, can cause constipation. Use stool softeners or mild laxative as needed.   Medication: You have been started on a new medication called _________  Activity: Avoid straining, heavy lifting (objects greater than 10 pounds), strenuous activity, twisting, bending, and vigorous exercise. You should not drive or operate machinery until cleared by your neurosurgeon.   Transsphenoidal: You have had a surgery that had a transsphenoidal approach through your nose. Due to this approach and it's close proximity to the brain, you should avoid using straws, incentive spirometers, nasal cannula, CPAP/BiPAP/High flow oxygenation, nasogastric tube placements, nose blowing, and nasal swabbing (including COVID testing). Any of the aforementioned can cause you to have a cerebrospinal fluid leak. Monitor for persistent leaking from your nose or the feeling of a persistent postnasal drip  Appointment(s): It is also important to see your primary physician to keep them up to date regarding your recent admission and for a formal outpatient comprehensive medical review. Call their offices for an appointment as soon as you leave the hospital. If you do not have a primary physician, you may contact the [facility] [phone] located on [address] [place] [address], [place] [address].  Should you develop any new or worsening neurologic symptoms or have concern about your incision, please call our office immediately or visit the emergency department.  Return to ER immediately for any of the following: fever, b     PRINCIPAL DISCHARGE DIAGNOSIS  Diagnosis: Pituitary microadenoma  Assessment and Plan of Treatment:

## 2024-08-02 NOTE — DISCHARGE NOTE PROVIDER - NSDCFUADDINST_GEN_ALL_CORE_FT
Diet: Please hold aspirin, NSAIDs, goody's powder, anticoagulation, vitamin E, turmeric/arriola lattes, cinnamon pills, fish oil, ginseng, and all other supplements until cleared by your neurosurgeon on an outpatient follow up appointment  Pain: It is common to have a headache or incisional pain after surgery. You may take the pain medication we prescribe, or over the counter Tylenol. Do not exceed 4000mg of tylenol in 24 hours. Pain medication, especially narcotics, can cause constipation. Use stool softeners or mild laxative as needed.   Medication:   1)You have been started on sodium chloride 0.65% nasal spray, you are to use one spray in each nostril every 2-4 hours. 2) please stop taking over the counter tumeric until cleared by neurosurgeon at follow up visit.    Activity: Avoid straining, heavy lifting (objects greater than 10 pounds), strenuous activity, twisting, bending, and vigorous exercise. You should not drive or operate machinery until cleared by your neurosurgeon.   Transsphenoidal: You have had a surgery that had a transsphenoidal approach through your nose. Due to this approach and it's close proximity to the brain, you should avoid using straws, incentive spirometers, nasal cannula, CPAP/BiPAP/High flow oxygenation, nasogastric tube placements, nose blowing, and nasal swabbing (including COVID testing). Any of the aforementioned can cause you to have a cerebrospinal fluid leak. Monitor for persistent leaking from your nose or the feeling of a persistent postnasal drip  Appointment(s): It is also important to see your primary physician to keep them up to date regarding your recent admission and for a formal outpatient comprehensive medical review. Call their offices for an appointment as soon as you leave the hospital. If you do not have a primary physician, you may contact the [facility] [place] [phone] located on [address] [place] [address], [place] [address].  Should you develop any new or worsening neurologic symptoms or have concern about your incision, please call our office immediately or visit the emergency department.  Return to ER immediately for any of the following: fever, blurry vision, pain or headache not controlled by medication, strange taste in the mouth, fever, irretraceable vomiting, persistent leaking from the nose or persistent post nasal drip

## 2024-08-03 LAB
ACTH SER-ACNC: 46 PG/ML — SIGNIFICANT CHANGE UP (ref 7.2–63.3)
ALBUMIN SERPL ELPH-MCNC: 3.6 G/DL — SIGNIFICANT CHANGE UP (ref 3.3–5.2)
ALP SERPL-CCNC: 55 U/L — SIGNIFICANT CHANGE UP (ref 40–120)
ALT FLD-CCNC: 20 U/L — SIGNIFICANT CHANGE UP
ANION GAP SERPL CALC-SCNC: 12 MMOL/L — SIGNIFICANT CHANGE UP (ref 5–17)
ANION GAP SERPL CALC-SCNC: 12 MMOL/L — SIGNIFICANT CHANGE UP (ref 5–17)
ANION GAP SERPL CALC-SCNC: 13 MMOL/L — SIGNIFICANT CHANGE UP (ref 5–17)
ANION GAP SERPL CALC-SCNC: 16 MMOL/L — SIGNIFICANT CHANGE UP (ref 5–17)
APPEARANCE UR: CLEAR — SIGNIFICANT CHANGE UP
AST SERPL-CCNC: 15 U/L — SIGNIFICANT CHANGE UP
BACTERIA # UR AUTO: NEGATIVE /HPF — SIGNIFICANT CHANGE UP
BILIRUB SERPL-MCNC: 0.4 MG/DL — SIGNIFICANT CHANGE UP (ref 0.4–2)
BILIRUB UR-MCNC: NEGATIVE — SIGNIFICANT CHANGE UP
BUN SERPL-MCNC: 5.8 MG/DL — LOW (ref 8–20)
BUN SERPL-MCNC: 5.9 MG/DL — LOW (ref 8–20)
BUN SERPL-MCNC: 7 MG/DL — LOW (ref 8–20)
BUN SERPL-MCNC: 7 MG/DL — LOW (ref 8–20)
CALCIUM SERPL-MCNC: 8.1 MG/DL — LOW (ref 8.4–10.5)
CALCIUM SERPL-MCNC: 8.6 MG/DL — SIGNIFICANT CHANGE UP (ref 8.4–10.5)
CALCIUM SERPL-MCNC: 8.7 MG/DL — SIGNIFICANT CHANGE UP (ref 8.4–10.5)
CALCIUM SERPL-MCNC: 9.1 MG/DL — SIGNIFICANT CHANGE UP (ref 8.4–10.5)
CAST: 5 /LPF — HIGH (ref 0–4)
CHLORIDE SERPL-SCNC: 104 MMOL/L — SIGNIFICANT CHANGE UP (ref 96–108)
CHLORIDE SERPL-SCNC: 104 MMOL/L — SIGNIFICANT CHANGE UP (ref 96–108)
CHLORIDE SERPL-SCNC: 105 MMOL/L — SIGNIFICANT CHANGE UP (ref 96–108)
CHLORIDE SERPL-SCNC: 105 MMOL/L — SIGNIFICANT CHANGE UP (ref 96–108)
CHOLEST SERPL-MCNC: 212 MG/DL — HIGH
CO2 SERPL-SCNC: 21 MMOL/L — LOW (ref 22–29)
CO2 SERPL-SCNC: 21 MMOL/L — LOW (ref 22–29)
CO2 SERPL-SCNC: 22 MMOL/L — SIGNIFICANT CHANGE UP (ref 22–29)
CO2 SERPL-SCNC: 22 MMOL/L — SIGNIFICANT CHANGE UP (ref 22–29)
COLOR SPEC: YELLOW — SIGNIFICANT CHANGE UP
CORTIS AM PEAK SERPL-MCNC: 28.4 UG/DL — HIGH (ref 6–18.4)
CORTIS F PM SERPL-MCNC: 46.7 UG/DL — HIGH (ref 2.7–10.5)
CREAT SERPL-MCNC: 0.52 MG/DL — SIGNIFICANT CHANGE UP (ref 0.5–1.3)
CREAT SERPL-MCNC: 0.52 MG/DL — SIGNIFICANT CHANGE UP (ref 0.5–1.3)
CREAT SERPL-MCNC: 0.55 MG/DL — SIGNIFICANT CHANGE UP (ref 0.5–1.3)
CREAT SERPL-MCNC: 0.58 MG/DL — SIGNIFICANT CHANGE UP (ref 0.5–1.3)
DIFF PNL FLD: ABNORMAL
EGFR: 129 ML/MIN/1.73M2 — SIGNIFICANT CHANGE UP
EGFR: 130 ML/MIN/1.73M2 — SIGNIFICANT CHANGE UP
EGFR: 132 ML/MIN/1.73M2 — SIGNIFICANT CHANGE UP
EGFR: 132 ML/MIN/1.73M2 — SIGNIFICANT CHANGE UP
GLUCOSE BLDC GLUCOMTR-MCNC: 102 MG/DL — HIGH (ref 70–99)
GLUCOSE BLDC GLUCOMTR-MCNC: 115 MG/DL — HIGH (ref 70–99)
GLUCOSE BLDC GLUCOMTR-MCNC: 140 MG/DL — HIGH (ref 70–99)
GLUCOSE SERPL-MCNC: 104 MG/DL — HIGH (ref 70–99)
GLUCOSE SERPL-MCNC: 111 MG/DL — HIGH (ref 70–99)
GLUCOSE SERPL-MCNC: 126 MG/DL — HIGH (ref 70–99)
GLUCOSE SERPL-MCNC: 139 MG/DL — HIGH (ref 70–99)
GLUCOSE UR QL: NEGATIVE MG/DL — SIGNIFICANT CHANGE UP
HCT VFR BLD CALC: 31.5 % — LOW (ref 34.5–45)
HDLC SERPL-MCNC: 66 MG/DL — SIGNIFICANT CHANGE UP
HGB BLD-MCNC: 10.6 G/DL — LOW (ref 11.5–15.5)
KETONES UR-MCNC: NEGATIVE MG/DL — SIGNIFICANT CHANGE UP
LEUKOCYTE ESTERASE UR-ACNC: ABNORMAL
LEUKOCYTE ESTERASE UR-ACNC: NEGATIVE — SIGNIFICANT CHANGE UP
LIPID PNL WITH DIRECT LDL SERPL: 129 MG/DL — HIGH
MAGNESIUM SERPL-MCNC: 2.1 MG/DL — SIGNIFICANT CHANGE UP (ref 1.8–2.6)
MCHC RBC-ENTMCNC: 29.3 PG — SIGNIFICANT CHANGE UP (ref 27–34)
MCHC RBC-ENTMCNC: 33.7 GM/DL — SIGNIFICANT CHANGE UP (ref 32–36)
MCV RBC AUTO: 87 FL — SIGNIFICANT CHANGE UP (ref 80–100)
NITRITE UR-MCNC: NEGATIVE — SIGNIFICANT CHANGE UP
NON HDL CHOLESTEROL: 146 MG/DL — HIGH
OSMOLALITY SERPL: 292 MOSMOL/KG — SIGNIFICANT CHANGE UP (ref 275–300)
OSMOLALITY UR: 144 MOSM/KG — LOW (ref 300–1000)
PH UR: 6.5 — SIGNIFICANT CHANGE UP (ref 5–8)
PH UR: 7 — SIGNIFICANT CHANGE UP (ref 5–8)
PH UR: 7 — SIGNIFICANT CHANGE UP (ref 5–8)
PH UR: 7.5 — SIGNIFICANT CHANGE UP (ref 5–8)
PHOSPHATE SERPL-MCNC: 2.3 MG/DL — LOW (ref 2.4–4.7)
PLATELET # BLD AUTO: 235 K/UL — SIGNIFICANT CHANGE UP (ref 150–400)
POTASSIUM SERPL-MCNC: 3.8 MMOL/L — SIGNIFICANT CHANGE UP (ref 3.5–5.3)
POTASSIUM SERPL-MCNC: 3.8 MMOL/L — SIGNIFICANT CHANGE UP (ref 3.5–5.3)
POTASSIUM SERPL-MCNC: 4 MMOL/L — SIGNIFICANT CHANGE UP (ref 3.5–5.3)
POTASSIUM SERPL-MCNC: 4 MMOL/L — SIGNIFICANT CHANGE UP (ref 3.5–5.3)
POTASSIUM SERPL-SCNC: 3.8 MMOL/L — SIGNIFICANT CHANGE UP (ref 3.5–5.3)
POTASSIUM SERPL-SCNC: 3.8 MMOL/L — SIGNIFICANT CHANGE UP (ref 3.5–5.3)
POTASSIUM SERPL-SCNC: 4 MMOL/L — SIGNIFICANT CHANGE UP (ref 3.5–5.3)
POTASSIUM SERPL-SCNC: 4 MMOL/L — SIGNIFICANT CHANGE UP (ref 3.5–5.3)
PROT SERPL-MCNC: 6.3 G/DL — LOW (ref 6.6–8.7)
PROT UR-MCNC: NEGATIVE MG/DL — SIGNIFICANT CHANGE UP
RBC # BLD: 3.62 M/UL — LOW (ref 3.8–5.2)
RBC # FLD: 12.5 % — SIGNIFICANT CHANGE UP (ref 10.3–14.5)
RBC CASTS # UR COMP ASSIST: 0 /HPF — SIGNIFICANT CHANGE UP (ref 0–4)
RBC CASTS # UR COMP ASSIST: 1 /HPF — SIGNIFICANT CHANGE UP (ref 0–4)
RBC CASTS # UR COMP ASSIST: 1 /HPF — SIGNIFICANT CHANGE UP (ref 0–4)
RBC CASTS # UR COMP ASSIST: 47 /HPF — HIGH (ref 0–4)
SODIUM SERPL-SCNC: 137 MMOL/L — SIGNIFICANT CHANGE UP (ref 135–145)
SODIUM SERPL-SCNC: 138 MMOL/L — SIGNIFICANT CHANGE UP (ref 135–145)
SODIUM SERPL-SCNC: 140 MMOL/L — SIGNIFICANT CHANGE UP (ref 135–145)
SODIUM SERPL-SCNC: 142 MMOL/L — SIGNIFICANT CHANGE UP (ref 135–145)
SODIUM UR-SCNC: <30 MMOL/L — SIGNIFICANT CHANGE UP
SP GR SPEC: 1 — SIGNIFICANT CHANGE UP (ref 1–1.03)
SP GR SPEC: 1.01 — SIGNIFICANT CHANGE UP (ref 1–1.03)
SQUAMOUS # UR AUTO: 0 /HPF — SIGNIFICANT CHANGE UP (ref 0–5)
SQUAMOUS # UR AUTO: 0 /HPF — SIGNIFICANT CHANGE UP (ref 0–5)
SQUAMOUS # UR AUTO: 1 /HPF — SIGNIFICANT CHANGE UP (ref 0–5)
SQUAMOUS # UR AUTO: 5 /HPF — SIGNIFICANT CHANGE UP (ref 0–5)
T4/T3 UPTAKE INDEX SERPL: 1.1 TBI — SIGNIFICANT CHANGE UP (ref 0.8–1.3)
TRIGL SERPL-MCNC: 87 MG/DL — SIGNIFICANT CHANGE UP
UROBILINOGEN FLD QL: 0.2 MG/DL — SIGNIFICANT CHANGE UP (ref 0.2–1)
UROBILINOGEN FLD QL: 0.2 MG/DL — SIGNIFICANT CHANGE UP (ref 0.2–1)
UROBILINOGEN FLD QL: 1 MG/DL — SIGNIFICANT CHANGE UP (ref 0.2–1)
UROBILINOGEN FLD QL: 1 MG/DL — SIGNIFICANT CHANGE UP (ref 0.2–1)
WBC # BLD: 10.71 K/UL — HIGH (ref 3.8–10.5)
WBC # FLD AUTO: 10.71 K/UL — HIGH (ref 3.8–10.5)
WBC UR QL: 0 /HPF — SIGNIFICANT CHANGE UP (ref 0–5)
WBC UR QL: 0 /HPF — SIGNIFICANT CHANGE UP (ref 0–5)
WBC UR QL: 1 /HPF — SIGNIFICANT CHANGE UP (ref 0–5)
WBC UR QL: 8 /HPF — HIGH (ref 0–5)

## 2024-08-03 PROCEDURE — 70553 MRI BRAIN STEM W/O & W/DYE: CPT | Mod: 26

## 2024-08-03 PROCEDURE — 99291 CRITICAL CARE FIRST HOUR: CPT

## 2024-08-03 RX ORDER — ACETAMINOPHEN 500 MG
1000 TABLET ORAL ONCE
Refills: 0 | Status: COMPLETED | OUTPATIENT
Start: 2024-08-03 | End: 2024-08-03

## 2024-08-03 RX ORDER — POTASSIUM CHLORIDE 1500 MG/1
20 TABLET, EXTENDED RELEASE ORAL ONCE
Refills: 0 | Status: COMPLETED | OUTPATIENT
Start: 2024-08-03 | End: 2024-08-03

## 2024-08-03 RX ORDER — POTASSIUM CHLORIDE 1500 MG/1
20 TABLET, EXTENDED RELEASE ORAL ONCE
Refills: 0 | Status: DISCONTINUED | OUTPATIENT
Start: 2024-08-03 | End: 2024-08-03

## 2024-08-03 RX ORDER — DESMOPRESSIN ACETATE 0.2 MG/1
0.25 TABLET ORAL ONCE
Refills: 0 | Status: COMPLETED | OUTPATIENT
Start: 2024-08-03 | End: 2024-08-03

## 2024-08-03 RX ADMIN — CHLORHEXIDINE GLUCONATE 1 APPLICATION(S): 500 CLOTH TOPICAL at 12:11

## 2024-08-03 RX ADMIN — Medication 650 MILLIGRAM(S): at 22:32

## 2024-08-03 RX ADMIN — Medication 400 MILLIGRAM(S): at 18:51

## 2024-08-03 RX ADMIN — Medication 400 MILLIGRAM(S): at 00:11

## 2024-08-03 RX ADMIN — Medication 650 MILLIGRAM(S): at 22:09

## 2024-08-03 RX ADMIN — Medication 400 MILLIGRAM(S): at 12:01

## 2024-08-03 RX ADMIN — ENOXAPARIN SODIUM 40 MILLIGRAM(S): 120 INJECTION SUBCUTANEOUS at 06:20

## 2024-08-03 RX ADMIN — Medication 1 SPRAY(S): at 17:49

## 2024-08-03 RX ADMIN — OXYCODONE HYDROCHLORIDE 10 MILLIGRAM(S): 30 TABLET ORAL at 17:20

## 2024-08-03 RX ADMIN — OXYCODONE HYDROCHLORIDE 10 MILLIGRAM(S): 30 TABLET ORAL at 16:23

## 2024-08-03 RX ADMIN — Medication 1000 MILLIGRAM(S): at 07:14

## 2024-08-03 RX ADMIN — Medication 1000 MILLIGRAM(S): at 12:30

## 2024-08-03 RX ADMIN — Medication 1 SPRAY(S): at 01:05

## 2024-08-03 RX ADMIN — POTASSIUM CHLORIDE 20 MILLIEQUIVALENT(S): 1500 TABLET, EXTENDED RELEASE ORAL at 02:15

## 2024-08-03 RX ADMIN — Medication 1 SPRAY(S): at 14:07

## 2024-08-03 RX ADMIN — DESMOPRESSIN ACETATE 0.25 MICROGRAM(S): 0.2 TABLET ORAL at 04:53

## 2024-08-03 RX ADMIN — Medication 400 MILLIGRAM(S): at 06:49

## 2024-08-03 RX ADMIN — Medication 1 SPRAY(S): at 22:11

## 2024-08-03 RX ADMIN — SENNOSIDES 2 TABLET(S): 8.6 TABLET ORAL at 22:09

## 2024-08-03 RX ADMIN — Medication 1000 MILLIGRAM(S): at 19:15

## 2024-08-03 RX ADMIN — Medication 1000 MILLIGRAM(S): at 01:54

## 2024-08-03 RX ADMIN — Medication 1 SPRAY(S): at 06:21

## 2024-08-03 RX ADMIN — Medication 1 SPRAY(S): at 09:37

## 2024-08-03 RX ADMIN — Medication 2000 MILLIGRAM(S): at 06:20

## 2024-08-03 NOTE — OCCUPATIONAL THERAPY INITIAL EVALUATION ADULT - LIVES WITH, PROFILE
Pt reports living in private home with parents, mom works from home able to assist. 2 GUERLINE with no handrail through the garage and 15 stairs inside with 1 handrail to bed/bath./parents

## 2024-08-03 NOTE — PROGRESS NOTE ADULT - SUBJECTIVE AND OBJECTIVE BOX
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    24hr EVENTS:  8/2 - OR for TSP. 0.25mcg ddavp for increased urine output  8/3 - PostOp MRI Brain    -----------------------------------------------------------------------------------------------------------------------------------------------------------------------------------    PHYSICAL EXAM:  General: Calm  HEENT: MMM  Neuro:  -Mental status- No acute distress  -CN- PERRL 3mm, EOMI, tongue midline, face symmetric  -Strength Full    CV: RRR  Pulm: clear to auscultation  Abd: Soft, nontender, nondistended  Ext: no noted edema in lower ext  Skin: warm, dry      -----------------------------------------------------------------------------------------------------------------------------------------------------------------------------------  ICU Vital Signs Last 24 Hrs  T(C): 36.9 (03 Aug 2024 11:24), Max: 37.1 (02 Aug 2024 13:00)  T(F): 98.4 (03 Aug 2024 11:24), Max: 98.7 (02 Aug 2024 13:00)  HR: 81 (03 Aug 2024 12:00) (71 - 119)  BP: 116/70 (03 Aug 2024 12:00) (108/73 - 133/83)  BP(mean): 83 (03 Aug 2024 12:00) (75 - 97)  ABP: 98/81 (03 Aug 2024 12:00) (93/59 - 150/67)  ABP(mean): 91 (03 Aug 2024 12:00) (76 - 92)  RR: 17 (03 Aug 2024 12:00) (14 - 21)  SpO2: 99% (03 Aug 2024 12:00) (98% - 100%)    O2 Parameters below as of 03 Aug 2024 08:00  Patient On (Oxygen Delivery Method): room air            I&O's Summary    02 Aug 2024 07:01  -  03 Aug 2024 07:00  --------------------------------------------------------  IN: 3270 mL / OUT: 3975 mL / NET: -705 mL    03 Aug 2024 07:01  -  03 Aug 2024 12:58  --------------------------------------------------------  IN: 950 mL / OUT: 300 mL / NET: 650 mL        MEDICATIONS  (STANDING):  chlorhexidine 2% Cloths 1 Application(s) Topical daily  enoxaparin Injectable 40 milliGRAM(s) SubCutaneous every 24 hours  polyethylene glycol 3350 17 Gram(s) Oral daily  senna 2 Tablet(s) Oral at bedtime  sodium chloride 0.65% Nasal 1 Spray(s) Both Nostrils every 4 hours      RESPIRATORY:        IMAGING:   Recent imaging studies were reviewed.    LAB RESULTS:                          10.6   10.71 )-----------( 235      ( 03 Aug 2024 06:30 )             31.5           08-03    137  |  104  |  7.0<L>  ----------------------------<  126<H>  3.8   |  21.0<L>  |  0.55    Ca    8.6      03 Aug 2024 12:27  Phos  2.3     08-03  Mg     2.1     08-03    TPro  6.3<L>  /  Alb  3.6  /  TBili  0.4  /  DBili  x   /  AST  15  /  ALT  20  /  AlkPhos  55  08-03          ABG - ( 02 Aug 2024 08:08 )  pH, Arterial: 7.410 pH, Blood: x     /  pCO2: 33    /  pO2: 287   / HCO3: 21    / Base Excess: -3.7  /  SaO2: 99.5

## 2024-08-03 NOTE — PHYSICAL THERAPY INITIAL EVALUATION ADULT - GENERAL OBSERVATIONS, REHAB EVAL
Pt received in bed + IV + a-line + vale + tele//BP monitoring, breathing on RA in NAD, in 0/10 pain, parents present, agreeable to PT eval

## 2024-08-03 NOTE — PHYSICAL THERAPY INITIAL EVALUATION ADULT - LEVEL OF INDEPENDENCE: SIT/STAND, REHAB EVAL
"      Thank you for choosing the [facility] s Developmental and Behavioral Pediatrics Department for your care!     To schedule appointments please contact the [facility] at [phone].     For medication refills please contact your child's pharmacy.  Your pharmacy will direct you to contact the clinic if there are no refills left or, for \"schedule II\" (controlled substances), if there are no remaining prescription orders.  If you have been directed by your pharmacy to contact the clinic for a prescription renewal, please call the [facility] [phone] or contact us via your Epic MyChart account.  Please allow 5-7 days for your refill request to be processed and sent to your pharmacy.      For behavioral emergencies (immediate concern for your child s safety or the safety of another) please contact the [facility] at [phone], go to your local Emergency Department or call 911.       For non-emergencies contact the [facility] at [phone] or reach out to us via [employer]. Please allow 3 business days for a response.    1) Please work on identifying areas for collaborative problem solving.  Some examples could include brushing teeth, showers, sleep, video games, or transitioning to non-preferred activities.  We can talk about your list and how the problem solving process has been going at your next clinic visit.   2) Please reach out to the [facility] to schedule an appointment.  Phone number is [phone].  3) Please keep your appointment with Dr. Starr on 7/29/2021.  4) Let us know if you have any questions or need any assistance scheduling with a therapist.  We can resend the list of potential options if needed.   5) Please follow up near the end of summer (6 weeks) to see how Jean Carlos is doing.  Thanks!    "
supervision

## 2024-08-03 NOTE — PHYSICAL THERAPY INITIAL EVALUATION ADULT - ADDITIONAL COMMENTS
pt reports living in private home with parents, mom works from home able to assist. 2 GUERLINE with no handrail and 15 stairs inside with 1 handrail. Independent prior to admission. Owns no DME

## 2024-08-03 NOTE — OCCUPATIONAL THERAPY INITIAL EVALUATION ADULT - GENERAL OBSERVATIONS, REHAB EVAL
Received pt semifowler in bed, NAD, +IV, +A-line, +Tele//BP, +Z[place], +b/l VCB on RA A&Ox4 with parents bedside. Pre/post pain 0/10. Pt agreeable to OT evaluation

## 2024-08-03 NOTE — OCCUPATIONAL THERAPY INITIAL EVALUATION ADULT - DIAGNOSIS, OT EVAL
23 year old Female with PMHx of pituitary microadenoma, cushings, heart palpitations, s/p cerebral angio 5/2024, anxiety, now s/p elective transsphenoidal pituitary adenomectomy with free mucosal graft (8/2)

## 2024-08-03 NOTE — PHYSICAL THERAPY INITIAL EVALUATION ADULT - PERTINENT HX OF CURRENT PROBLEM, REHAB EVAL
23F PMHx of pituitary microadenoma, cushings, heart palpitations, s/p cerebral angio 5/2024, anxiety, s/p elective transsphenoidal pituitary adenomectomy with free mucosal graft, POD#0.

## 2024-08-03 NOTE — OCCUPATIONAL THERAPY INITIAL EVALUATION ADULT - NS ASR FOLLOW COMMAND OT EVAL
Sleeping at this time, no distress noted  O2 at 1L via NC  Safe and fall precautions in place at this time and maintained   Monitoring continues 100% of the time/able to follow multistep instructions

## 2024-08-03 NOTE — OCCUPATIONAL THERAPY INITIAL EVALUATION ADULT - VISUAL ACUITY
+glasses/contacts; no complaints in vision offered. Central and peripheral fields intact bilaterally

## 2024-08-03 NOTE — PATIENT PROFILE ADULT - FALL HARM RISK - HARM RISK INTERVENTIONS

## 2024-08-03 NOTE — PROGRESS NOTE ADULT - NUTRITIONAL ASSESSMENT
Plan  - Neurochecks  - Nasal Precautions  - SBP <160  - SpO2 > 94%  - Full Diet  - DI Watch: BMP/UA q6h  - f/u Cortisol today, consider Cortisol POD3 to r/o AI  - Appreciate Endo  - No PeriOp Steroids  - Glucose <180  - SCD, SQL    My full attention was spent providing medically necessary critical care to the patient with details documented in my note above.   Critical care time spent examining patient, reviewing vitals, labs, medications, imaging and discussing with the team goals of care   The combined critical care time provided to the patient was 60 minutes  This time does not include bedside procedures that are documented separately.

## 2024-08-03 NOTE — OCCUPATIONAL THERAPY INITIAL EVALUATION ADULT - ADDITIONAL COMMENTS
Pt has a shower with doors and no grab bars. Independent prior to admission. Owns no DME. Pt is L handed and can drive but does not currently.

## 2024-08-03 NOTE — PROGRESS NOTE ADULT - SUBJECTIVE AND OBJECTIVE BOX
Subjective:   Patient seen and examined at bedside. Patient reports bloody discharge from nose after vomiting today and yesterday. No other drainage reported. Denies salty metallic taste or vision changes.       MEDICATIONS  (STANDING):  acetaminophen   IVPB .. 1000 milliGRAM(s) IV Intermittent once  chlorhexidine 2% Cloths 1 Application(s) Topical daily  enoxaparin Injectable 40 milliGRAM(s) SubCutaneous every 24 hours  polyethylene glycol 3350 17 Gram(s) Oral daily  senna 2 Tablet(s) Oral at bedtime  sodium chloride 0.65% Nasal 1 Spray(s) Both Nostrils every 4 hours    MEDICATIONS  (PRN):  acetaminophen     Tablet .. 650 milliGRAM(s) Oral every 6 hours PRN Mild Pain (1 - 3)  hydrALAZINE Injectable 10 milliGRAM(s) IV Push every 2 hours PRN SBP > 150mm Hg  labetalol Injectable 10 milliGRAM(s) IV Push every 2 hours PRN Systolic blood pressure >150  ondansetron Injectable 4 milliGRAM(s) IV Push every 6 hours PRN Nausea and/or Vomiting  oxyCODONE    IR 5 milliGRAM(s) Oral every 4 hours PRN Moderate Pain (4 - 6)  oxyCODONE    IR 10 milliGRAM(s) Oral every 4 hours PRN Severe Pain (7 - 10)      Vital Signs Last 24 Hrs  T(C): 36.7 (03 Aug 2024 16:35), Max: 36.9 (03 Aug 2024 11:24)  T(F): 98.1 (03 Aug 2024 16:35), Max: 98.4 (03 Aug 2024 11:24)  HR: 79 (03 Aug 2024 18:00) (71 - 119)  BP: 119/82 (03 Aug 2024 18:00) (108/73 - 124/66)  BP(mean): 92 (03 Aug 2024 18:00) (75 - 92)  RR: 15 (03 Aug 2024 18:00) (14 - 23)  SpO2: 98% (03 Aug 2024 18:00) (98% - 100%)    Parameters below as of 03 Aug 2024 08:00  Patient On (Oxygen Delivery Method): room air        Physical Exam:    Constitutional: NAD  HEENT: PERRL, EOMI, nasal dressing in place, dried blood in nares  Respiratory: Respirations non-labored, no accessory muscle use  Gastrointestinal: Soft, non-tender, non-distended  Neurological: A&O x 3      LABS:                        10.6   10.71 )-----------( 235      ( 03 Aug 2024 06:30 )             31.5     08-03    142  |  104  |  7.0<L>  ----------------------------<  104<H>  4.0   |  22.0  |  0.52    Ca    9.1      03 Aug 2024 17:40  Phos  2.3     08-03  Mg     2.1     08-03    TPro  6.3<L>  /  Alb  3.6  /  TBili  0.4  /  DBili  x   /  AST  15  /  ALT  20  /  AlkPhos  55  08-03      Urinalysis Basic - ( 03 Aug 2024 17:40 )    Color: x / Appearance: x / SG: x / pH: x  Gluc: 104 mg/dL / Ketone: x  / Bili: x / Urobili: x   Blood: x / Protein: x / Nitrite: x   Leuk Esterase: x / RBC: x / WBC x   Sq Epi: x / Non Sq Epi: x / Bacteria: x        A:

## 2024-08-03 NOTE — PATIENT PROFILE ADULT - FUNCTIONAL ASSESSMENT - BASIC MOBILITY SCORE.
[x ] 1 or more chronic illnesses with exacerbation, progression or side effects of treatment  [x ] 2 or more stable, chronic illnesses  [ ] 1 undiagnosed new problem with uncertain prognosis  [ ] 1 acute illness with systemic symptoms  [ ] 1 acute complicated injury    [x ] I reviewed prior external notes  [ ] I reviewed test results  [ ] I ordered test  [ x] I interpreted lab/ imaging : CXR  [ ] I discussed management or test interpretation with the following physicians:     [x ] prescription drug management: erythromycin ointment  [ ] decision regarding minor surgery  [ ] diagnosis or treatment significantly limited by social determinants of health
24

## 2024-08-03 NOTE — PROGRESS NOTE ADULT - ASSESSMENT
23F PMHx cushings s/p endonasal transphenoidal resection of pituitary microadenoma POD1    Plan:  -F/U MRI brain  -F/U endo recs  -Monitor for CSF leak   -Nasal saline sprays q 2 hours keep at bedside for frequent use  ENT will follow closely 23F PMHx cushings s/p endonasal transphenoidal resection of pituitary microadenoma POD1    Plan:  -F/U MRI brain  -F/U endo recs  -skullbase precautions  -Monitor for CSF leak   -Nasal saline sprays q 2 hours keep at bedside for frequent use  ENT will follow closely

## 2024-08-04 LAB
ACTH SER-ACNC: 12.3 PG/ML — SIGNIFICANT CHANGE UP (ref 7.2–63.3)
ACTH SER-ACNC: 8.3 PG/ML — SIGNIFICANT CHANGE UP (ref 7.2–63.3)
ANION GAP SERPL CALC-SCNC: 13 MMOL/L — SIGNIFICANT CHANGE UP (ref 5–17)
ANION GAP SERPL CALC-SCNC: 14 MMOL/L — SIGNIFICANT CHANGE UP (ref 5–17)
ANION GAP SERPL CALC-SCNC: 14 MMOL/L — SIGNIFICANT CHANGE UP (ref 5–17)
APPEARANCE UR: CLEAR — SIGNIFICANT CHANGE UP
BACTERIA # UR AUTO: ABNORMAL /HPF
BILIRUB UR-MCNC: NEGATIVE — SIGNIFICANT CHANGE UP
BUN SERPL-MCNC: 6.6 MG/DL — LOW (ref 8–20)
BUN SERPL-MCNC: 7.2 MG/DL — LOW (ref 8–20)
BUN SERPL-MCNC: 8.1 MG/DL — SIGNIFICANT CHANGE UP (ref 8–20)
CALCIUM SERPL-MCNC: 8.7 MG/DL — SIGNIFICANT CHANGE UP (ref 8.4–10.5)
CALCIUM SERPL-MCNC: 9 MG/DL — SIGNIFICANT CHANGE UP (ref 8.4–10.5)
CALCIUM SERPL-MCNC: 9.1 MG/DL — SIGNIFICANT CHANGE UP (ref 8.4–10.5)
CHLORIDE SERPL-SCNC: 103 MMOL/L — SIGNIFICANT CHANGE UP (ref 96–108)
CHLORIDE SERPL-SCNC: 104 MMOL/L — SIGNIFICANT CHANGE UP (ref 96–108)
CHLORIDE SERPL-SCNC: 107 MMOL/L — SIGNIFICANT CHANGE UP (ref 96–108)
CO2 SERPL-SCNC: 21 MMOL/L — LOW (ref 22–29)
CO2 SERPL-SCNC: 22 MMOL/L — SIGNIFICANT CHANGE UP (ref 22–29)
CO2 SERPL-SCNC: 22 MMOL/L — SIGNIFICANT CHANGE UP (ref 22–29)
COLOR SPEC: YELLOW — SIGNIFICANT CHANGE UP
CORTIS AM PEAK SERPL-MCNC: 25.8 UG/DL — HIGH (ref 6–18.4)
CREAT SERPL-MCNC: 0.52 MG/DL — SIGNIFICANT CHANGE UP (ref 0.5–1.3)
CREAT SERPL-MCNC: 0.53 MG/DL — SIGNIFICANT CHANGE UP (ref 0.5–1.3)
CREAT SERPL-MCNC: 0.54 MG/DL — SIGNIFICANT CHANGE UP (ref 0.5–1.3)
DIFF PNL FLD: ABNORMAL
EGFR: 131 ML/MIN/1.73M2 — SIGNIFICANT CHANGE UP
EGFR: 132 ML/MIN/1.73M2 — SIGNIFICANT CHANGE UP
EGFR: 132 ML/MIN/1.73M2 — SIGNIFICANT CHANGE UP
GLUCOSE SERPL-MCNC: 107 MG/DL — HIGH (ref 70–99)
GLUCOSE SERPL-MCNC: 109 MG/DL — HIGH (ref 70–99)
GLUCOSE SERPL-MCNC: 99 MG/DL — SIGNIFICANT CHANGE UP (ref 70–99)
GLUCOSE UR QL: NEGATIVE MG/DL — SIGNIFICANT CHANGE UP
HCT VFR BLD CALC: 35.2 % — SIGNIFICANT CHANGE UP (ref 34.5–45)
HGB BLD-MCNC: 11.6 G/DL — SIGNIFICANT CHANGE UP (ref 11.5–15.5)
KETONES UR-MCNC: NEGATIVE MG/DL — SIGNIFICANT CHANGE UP
LEUKOCYTE ESTERASE UR-ACNC: ABNORMAL
MAGNESIUM SERPL-MCNC: 2.2 MG/DL — SIGNIFICANT CHANGE UP (ref 1.6–2.6)
MCHC RBC-ENTMCNC: 29.4 PG — SIGNIFICANT CHANGE UP (ref 27–34)
MCHC RBC-ENTMCNC: 33 GM/DL — SIGNIFICANT CHANGE UP (ref 32–36)
MCV RBC AUTO: 89.1 FL — SIGNIFICANT CHANGE UP (ref 80–100)
NITRITE UR-MCNC: NEGATIVE — SIGNIFICANT CHANGE UP
PH UR: 6.5 — SIGNIFICANT CHANGE UP (ref 5–8)
PHOSPHATE SERPL-MCNC: 3.1 MG/DL — SIGNIFICANT CHANGE UP (ref 2.4–4.7)
PLATELET # BLD AUTO: 257 K/UL — SIGNIFICANT CHANGE UP (ref 150–400)
POTASSIUM SERPL-MCNC: 3.9 MMOL/L — SIGNIFICANT CHANGE UP (ref 3.5–5.3)
POTASSIUM SERPL-MCNC: 4 MMOL/L — SIGNIFICANT CHANGE UP (ref 3.5–5.3)
POTASSIUM SERPL-MCNC: 4.1 MMOL/L — SIGNIFICANT CHANGE UP (ref 3.5–5.3)
POTASSIUM SERPL-SCNC: 3.9 MMOL/L — SIGNIFICANT CHANGE UP (ref 3.5–5.3)
POTASSIUM SERPL-SCNC: 4 MMOL/L — SIGNIFICANT CHANGE UP (ref 3.5–5.3)
POTASSIUM SERPL-SCNC: 4.1 MMOL/L — SIGNIFICANT CHANGE UP (ref 3.5–5.3)
PROT UR-MCNC: NEGATIVE MG/DL — SIGNIFICANT CHANGE UP
RBC # BLD: 3.95 M/UL — SIGNIFICANT CHANGE UP (ref 3.8–5.2)
RBC # FLD: 12.9 % — SIGNIFICANT CHANGE UP (ref 10.3–14.5)
RBC CASTS # UR COMP ASSIST: 2 /HPF — SIGNIFICANT CHANGE UP (ref 0–4)
SODIUM SERPL-SCNC: 139 MMOL/L — SIGNIFICANT CHANGE UP (ref 135–145)
SODIUM SERPL-SCNC: 139 MMOL/L — SIGNIFICANT CHANGE UP (ref 135–145)
SODIUM SERPL-SCNC: 141 MMOL/L — SIGNIFICANT CHANGE UP (ref 135–145)
SP GR SPEC: 1.01 — SIGNIFICANT CHANGE UP (ref 1–1.03)
SQUAMOUS # UR AUTO: 4 /HPF — SIGNIFICANT CHANGE UP (ref 0–5)
UROBILINOGEN FLD QL: 1 MG/DL — SIGNIFICANT CHANGE UP (ref 0.2–1)
WBC # BLD: 9.69 K/UL — SIGNIFICANT CHANGE UP (ref 3.8–10.5)
WBC # FLD AUTO: 9.69 K/UL — SIGNIFICANT CHANGE UP (ref 3.8–10.5)
WBC UR QL: 4 /HPF — SIGNIFICANT CHANGE UP (ref 0–5)

## 2024-08-04 PROCEDURE — 99233 SBSQ HOSP IP/OBS HIGH 50: CPT

## 2024-08-04 RX ORDER — LABETALOL HCL 200 MG
10 TABLET ORAL
Refills: 0 | Status: DISCONTINUED | OUTPATIENT
Start: 2024-08-04 | End: 2024-08-05

## 2024-08-04 RX ORDER — HYDRALAZINE HYDROCHLORIDE 100 MG/1
10 TABLET ORAL
Refills: 0 | Status: DISCONTINUED | OUTPATIENT
Start: 2024-08-04 | End: 2024-08-05

## 2024-08-04 RX ORDER — ACETAMINOPHEN 500 MG
1000 TABLET ORAL ONCE
Refills: 0 | Status: COMPLETED | OUTPATIENT
Start: 2024-08-04 | End: 2024-08-04

## 2024-08-04 RX ADMIN — Medication 650 MILLIGRAM(S): at 23:57

## 2024-08-04 RX ADMIN — Medication 1 SPRAY(S): at 01:30

## 2024-08-04 RX ADMIN — ENOXAPARIN SODIUM 40 MILLIGRAM(S): 120 INJECTION SUBCUTANEOUS at 05:51

## 2024-08-04 RX ADMIN — Medication 1 SPRAY(S): at 13:05

## 2024-08-04 RX ADMIN — Medication 17 GRAM(S): at 11:14

## 2024-08-04 RX ADMIN — CHLORHEXIDINE GLUCONATE 1 APPLICATION(S): 500 CLOTH TOPICAL at 11:23

## 2024-08-04 RX ADMIN — SENNOSIDES 2 TABLET(S): 8.6 TABLET ORAL at 22:22

## 2024-08-04 RX ADMIN — Medication 1 SPRAY(S): at 22:22

## 2024-08-04 RX ADMIN — Medication 1 SPRAY(S): at 05:51

## 2024-08-04 RX ADMIN — Medication 400 MILLIGRAM(S): at 06:51

## 2024-08-04 RX ADMIN — Medication 4 MILLIGRAM(S): at 06:05

## 2024-08-04 RX ADMIN — Medication 650 MILLIGRAM(S): at 13:04

## 2024-08-04 RX ADMIN — Medication 1 SPRAY(S): at 10:15

## 2024-08-04 RX ADMIN — Medication 650 MILLIGRAM(S): at 20:21

## 2024-08-04 RX ADMIN — Medication 1 SPRAY(S): at 18:27

## 2024-08-04 RX ADMIN — Medication 650 MILLIGRAM(S): at 14:00

## 2024-08-04 RX ADMIN — Medication 1000 MILLIGRAM(S): at 07:39

## 2024-08-04 NOTE — PROGRESS NOTE ADULT - SUBJECTIVE AND OBJECTIVE BOX
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 States she experienced a traumatic event in 07/2023, was suffering from worsening anxiety. States she also was noticing worsening fatigue with weight gain prompting follow up with endocrinology who found her cortisol levels to be elevated. Cerebral angiogram done with bilateral inferior petrosal sinus sampling. Testing has showed elevations in ACTH from the left pituitary, confirming central Cushing's disease from the left microadenoma. Patient reports intermittent headaches, described as pressure, 7/10 in severity, worse with loud noises and bright lights, relieved with NSAIDs. Denies fevers, chills, nausea or vomiting. Patient is scheduled for endoscopic endonasal transsphenoidal pituitary adenoma resection, transsphenoidal resection, pituitary microadenoma, possible nasoseptal flap, possible free mucosal graft (CECILY) on 8/2/24 with Dr. Garsia, secondary surgeon Dr. Bain.  (26 Jul 2024 09:51)      INTERVAL HPI/OVERNIGHT EVENTS:  8/2: OR, pacu then Neuro ICU  8/3: ON dumping; going into DI, given 0.25 mcg DDAVP. Ofirmev x2.  8/4: 23y Female s/p endoscopic transphenoidal pituitary adenoma resection with free mucosal graft, seen lying comfortably in bed. Tolerating diet. Voiding with clear urine. Denies headache, metallic or salty taste in mouth, weakness, numbness, n/v/d, fevers, chills, chest pain, SOB. Voiding a lot but SG and serum sodium not within DI range.     Vital Signs Last 24 Hrs  T(C): 36.4 (03 Aug 2024 21:29), Max: 36.9 (03 Aug 2024 11:24)  T(F): 97.6 (03 Aug 2024 21:29), Max: 98.4 (03 Aug 2024 11:24)  HR: 65 (04 Aug 2024 01:00) (65 - 119)  BP: 122/69 (04 Aug 2024 01:00) (110/61 - 124/66)  BP(mean): 84 (04 Aug 2024 01:00) (75 - 92)  RR: 15 (04 Aug 2024 01:00) (14 - 23)  SpO2: 99% (04 Aug 2024 01:00) (97% - 100%)    Parameters below as of 04 Aug 2024 00:00  Patient On (Oxygen Delivery Method): room air        PHYSICAL EXAM:  GENERAL: NAD, well-groomed  HEAD:  Atraumatic, normocephalic  GIANA COMA SCORE: E- V- M- = 15       E: 4= opens eyes spontaneously 3= to voice 2= to noxious 1= no opening       V: 5= oriented 4= confused 3= inappropriate words 2= incomprehensible sounds 1= nonverbal 1T= intubated       M: 6= follows commands 5= localizes 4= withdraws 3= flexor posturing 2= extensor posturing 1= no movement  MENTAL STATUS: AAO x3; Awake; Opens eyes spontaneously; Appropriately conversant without aphasia; following simple commands   CRANIAL NERVES: Visual fields full to confrontation, PERRL. EOMI without nystagmus. Facial sensation intact V1-3 distribution b/l. Face symmetric w/ normal eye closure and smile, tongue midline. Hearing grossly intact. Speech clear. Head turning and shoulder shrug intact.   REFLEXES: PERRL. Corneals intact b/l. Gag intact. Cough intact. Oculocephalic reflex intact (Doll's eye). Negative Hernandez's b/l. Negative clonus b/l  MOTOR: strength 5/5 b/l upper and lower extremities  SENSATION: grossly intact to light touch all extremities  CHEST/LUNG: Chest rise and fall equally and bilaterally   HEART: +S1/+S2   ABDOMEN: Soft, nontender, nondistended   EXTREMITIES:  2+ peripheral pulses, no clubbing, cyanosis, or edema  SKIN: Warm, dry; no rashes or lesions    LABS:                        10.6   10.71 )-----------( 235      ( 03 Aug 2024 06:30 )             31.5     08-03    141  |  107  |  8.1  ----------------------------<  107<H>  3.9   |  22.0  |  0.52    Ca    8.7      03 Aug 2024 23:45  Phos  2.3     08-03  Mg     2.1     08-03    TPro  6.3<L>  /  Alb  3.6  /  TBili  0.4  /  DBili  x   /  AST  15  /  ALT  20  /  AlkPhos  55  08-03      Urinalysis Basic - ( 03 Aug 2024 23:45 )    Color: x / Appearance: x / SG: x / pH: x  Gluc: 107 mg/dL / Ketone: x  / Bili: x / Urobili: x   Blood: x / Protein: x / Nitrite: x   Leuk Esterase: x / RBC: x / WBC x   Sq Epi: x / Non Sq Epi: x / Bacteria: x        08-02 @ 07:01  -  08-03 @ 07:00  --------------------------------------------------------  IN: 3270 mL / OUT: 3975 mL / NET: -705 mL    08-03 @ 07:01  -  08-04 @ 01:17  --------------------------------------------------------  IN: 2690 mL / OUT: 4050 mL / NET: -1360 mL        RADIOLOGY & ADDITIONAL TESTS:  8/3 MR Brain w/wout: No acute intracranial pathology    ASSESSMENT AND PLAN:   - s/p TSP   - TSP precautions   - MRI read   - SBP <150   - s/p ancef x24 hours   - cw diet   - monitor for DI   - D/w Dr. Garsia

## 2024-08-04 NOTE — PROGRESS NOTE ADULT - ASSESSMENT
A 23 F with history of Cushing's s/p endonasal transphenoidal resection of pituitary microadenoma POD#2, with MRI brain showing expected post-surgical changes though with minor 3.8mm area of enhancement where residual adenoma cannot be excluded (full findings as above).        Problem/Plan - 1:  Problem: History of pituitary surgery.   - Skullbase precautions   - Monitor for CSF leak (salty, metallic taste)   - Nasal saline sprays Q2H (keep at bedside for frequent use)  - appreciate endo recs  - ENT will follow closely.

## 2024-08-04 NOTE — PROGRESS NOTE ADULT - ASSESSMENT
Plan  - Neurochecks  - Nasal Precautions  - SBP <160  - SpO2 > 94%  - Full Diet  - DI Watch: BMP/UA q12h  - f/u Cortisol   - Appreciate Endo  - Glucose <180  - SCD, SQL Plan  - Neurochecks  - Nasal Precautions  - SBP <160  - SpO2 > 94%  - Full Diet  - DI Watch: BMP/UA q12h, goal UOP<300cc/hr  - f/u Cortisol   - Appreciate Endo  - Glucose <180  - SCD, SQL

## 2024-08-04 NOTE — PROGRESS NOTE ADULT - SUBJECTIVE AND OBJECTIVE BOX
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 States she experienced a traumatic event in 07/2023, was suffering from worsening anxiety. States she also was noticing worsening fatigue with weight gain prompting follow up with endocrinology who found her cortisol levels to be elevated. Cerebral angiogram done with bilateral inferior petrosal sinus sampling. Testing has showed elevations in ACTH from the left pituitary, confirming central Cushing's disease from the left microadenoma. Patient reports intermittent headaches, described as pressure, 7/10 in severity, worse with loud noises and bright lights, relieved with NSAIDs. Denies fevers, chills, nausea or vomiting. Patient is scheduled for endoscopic endonasal transsphenoidal pituitary adenoma resection, transsphenoidal resection, pituitary microadenoma, possible nasoseptal flap, possible free mucosal graft (CECILY) on 8/2/24 with Dr. Garsia, secondary surgeon Dr. Bain.  (26 Jul 2024 09:51)    24hr EVENTS:  stable sodium, drinking to thirst    ROS: [x ]  Unable to assess due to mental status   All other systems negative    -----------------------------------------------------------------------------------------------------------------------------------------------------------------------------------  ICU Vital Signs Last 24 Hrs  T(C): 36.6 (04 Aug 2024 07:49), Max: 36.7 (03 Aug 2024 16:35)  T(F): 97.8 (04 Aug 2024 07:49), Max: 98.1 (03 Aug 2024 16:35)  HR: 86 (04 Aug 2024 12:00) (65 - 100)  BP: 130/77 (04 Aug 2024 12:00) (114/66 - 130/77)  BP(mean): 90 (04 Aug 2024 12:00) (78 - 97)  ABP: --  ABP(mean): --  RR: 19 (04 Aug 2024 12:00) (14 - 23)  SpO2: 98% (04 Aug 2024 12:00) (97% - 99%)    O2 Parameters below as of 04 Aug 2024 12:00  Patient On (Oxygen Delivery Method): room air      I&O's Summary    03 Aug 2024 07:01  -  04 Aug 2024 07:00  --------------------------------------------------------  IN: 3410 mL / OUT: 4600 mL / NET: -1190 mL    04 Aug 2024 07:01  -  04 Aug 2024 13:07  --------------------------------------------------------  IN: 960 mL / OUT: 1200 mL / NET: -240 mL        MEDICATIONS  (STANDING):  chlorhexidine 2% Cloths 1 Application(s) Topical daily  enoxaparin Injectable 40 milliGRAM(s) SubCutaneous every 24 hours  polyethylene glycol 3350 17 Gram(s) Oral daily  senna 2 Tablet(s) Oral at bedtime  sodium chloride 0.65% Nasal 1 Spray(s) Both Nostrils every 4 hours        IMAGING:   Recent imaging studies were reviewed.    LAB RESULTS:                          11.6   9.69  )-----------( 257      ( 04 Aug 2024 05:48 )             35.2     08-04    139  |  103  |  7.2<L>  ----------------------------<  99  4.0   |  22.0  |  0.54    Ca    9.0      04 Aug 2024 11:15  Phos  3.1     08-04  Mg     2.2     08-04    TPro  6.3<L>  /  Alb  3.6  /  TBili  0.4  /  DBili  x   /  AST  15  /  ALT  20  /  AlkPhos  55  08-03      -----------------------------------------------------------------------------------------------------------------------------------------------------------------------------------  PHYSICAL EXAM:  General: Calm, laying in bed  HEENT: MMM  Neuro:  -Mental status- No acute distress, AOx3, conversational, following commands  -CN- PERRL 3mm, EOMI, tongue midline, face symmetric  -Motor- full strength in all ext  -Sensation- intact to LT   -Coordination- no dysmetria noted    CV: RRR  Pulm: Clear to auscultation  Abd: Soft, nontender, nondistended  Ext: No edema  Skin: warm, dry

## 2024-08-04 NOTE — PROGRESS NOTE ADULT - SUBJECTIVE AND OBJECTIVE BOX
INTERVAL HPI:    Patient evaluated at bedside. No acute distress. No acute events overnight.    MEDICATIONS  (STANDING):  chlorhexidine 2% Cloths 1 Application(s) Topical daily  enoxaparin Injectable 40 milliGRAM(s) SubCutaneous every 24 hours  polyethylene glycol 3350 17 Gram(s) Oral daily  senna 2 Tablet(s) Oral at bedtime  sodium chloride 0.65% Nasal 1 Spray(s) Both Nostrils every 4 hours    MEDICATIONS  (PRN):  acetaminophen     Tablet .. 650 milliGRAM(s) Oral every 6 hours PRN Mild Pain (1 - 3)  hydrALAZINE Injectable 10 milliGRAM(s) IV Push every 2 hours PRN SBP > 150mm Hg  labetalol Injectable 10 milliGRAM(s) IV Push every 2 hours PRN Systolic blood pressure >150  ondansetron Injectable 4 milliGRAM(s) IV Push every 6 hours PRN Nausea and/or Vomiting  oxyCODONE    IR 5 milliGRAM(s) Oral every 4 hours PRN Moderate Pain (4 - 6)  oxyCODONE    IR 10 milliGRAM(s) Oral every 4 hours PRN Severe Pain (7 - 10)      Vital Signs Last 24 Hrs  T(C): 36.4 (03 Aug 2024 21:29), Max: 36.9 (03 Aug 2024 11:24)  T(F): 97.6 (03 Aug 2024 21:29), Max: 98.4 (03 Aug 2024 11:24)  HR: 85 (04 Aug 2024 06:00) (65 - 119)  BP: 125/86 (04 Aug 2024 06:00) (114/66 - 125/86)  BP(mean): 97 (04 Aug 2024 06:00) (81 - 97)  RR: 19 (04 Aug 2024 06:00) (14 - 23)  SpO2: 98% (04 Aug 2024 06:00) (97% - 100%)    Parameters below as of 04 Aug 2024 04:00  Patient On (Oxygen Delivery Method): room air        Constitutional: NAD  Respiratory: Normal work of breathing, no accessory muscle use  Gastrointestinal:   Extremities: No peripheral edema, No cyanosis, clubbing   Vascular:   Neurological: A&O x 3  Psychiatric: Normal mood, normal affect      I&O's Detail    03 Aug 2024 07:01  -  04 Aug 2024 07:00  --------------------------------------------------------  IN:    IV PiggyBack: 50 mL    Oral Fluid: 2960 mL    sodium chloride 0.9%: 300 mL  Total IN: 3310 mL    OUT:    Indwelling Catheter - Urethral (mL): 300 mL    Voided (mL): 4300 mL  Total OUT: 4600 mL    Total NET: -1290 mL          LABS:                        11.6   9.69  )-----------( 257      ( 04 Aug 2024 05:48 )             35.2     08-04    139  |  104  |  6.6<L>  ----------------------------<  109<H>  4.1   |  21.0<L>  |  0.53    Ca    9.1      04 Aug 2024 05:48  Phos  3.1     08-04  Mg     2.2     08-04    TPro  6.3<L>  /  Alb  3.6  /  TBili  0.4  /  DBili  x   /  AST  15  /  ALT  20  /  AlkPhos  55  08-03      Urinalysis Basic - ( 04 Aug 2024 05:48 )    Color: Yellow / Appearance: Clear / S.008 / pH: x  Gluc: 109 mg/dL / Ketone: Negative mg/dL  / Bili: Negative / Urobili: 1.0 mg/dL   Blood: x / Protein: Negative mg/dL / Nitrite: Negative   Leuk Esterase: Trace / RBC: 2 /HPF / WBC 4 /HPF   Sq Epi: x / Non Sq Epi: 4 /HPF / Bacteria: Occasional /HPF    MRI Brain:   FINDINGS:  The patient is status post transsphenoidal approach resection of pituitary microadenoma. Postsurgical changes and fat packing is demonstrated in the sphenoid sinuses. Hypoenhancement in the left aspect of the sella on image 22 of series 7 measures 3.8 mm suggest postsurgical changes. Can't exclude residual adenoma. Continued routine follow-up imaging is recommended with serology correlation.  No abnormal leptomeningeal or parenchymal enhancement. 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  There is no abnormal restricted diffusion to suggest acute infarction. No abnormal signal is demonstrated throughout the brain parenchyma. Normal T2 flow-voids are seen within the intracranial vasculature. The lateral ventricles and cortical sulci are age-appropriate in size and configuration. There is no mass, mass effect, or extra-axial fluid collection. There is no susceptibility artifact to suggest hemorrhage. Midline structures are normal. The mastoid air cells and orbits are unremarkable. INTERVAL HPI:    Patient evaluated at bedside. No acute distress. No acute events overnight.  - Denies salty/metallic taste   - Is using nasal saline rise regularly    MEDICATIONS  (STANDING):  chlorhexidine 2% Cloths 1 Application(s) Topical daily  enoxaparin Injectable 40 milliGRAM(s) SubCutaneous every 24 hours  polyethylene glycol 3350 17 Gram(s) Oral daily  senna 2 Tablet(s) Oral at bedtime  sodium chloride 0.65% Nasal 1 Spray(s) Both Nostrils every 4 hours    MEDICATIONS  (PRN):  acetaminophen     Tablet .. 650 milliGRAM(s) Oral every 6 hours PRN Mild Pain (1 - 3)  hydrALAZINE Injectable 10 milliGRAM(s) IV Push every 2 hours PRN SBP > 150mm Hg  labetalol Injectable 10 milliGRAM(s) IV Push every 2 hours PRN Systolic blood pressure >150  ondansetron Injectable 4 milliGRAM(s) IV Push every 6 hours PRN Nausea and/or Vomiting  oxyCODONE    IR 5 milliGRAM(s) Oral every 4 hours PRN Moderate Pain (4 - 6)  oxyCODONE    IR 10 milliGRAM(s) Oral every 4 hours PRN Severe Pain (7 - 10)      Vital Signs Last 24 Hrs  T(C): 36.4 (03 Aug 2024 21:29), Max: 36.9 (03 Aug 2024 11:24)  T(F): 97.6 (03 Aug 2024 21:29), Max: 98.4 (03 Aug 2024 11:24)  HR: 85 (04 Aug 2024 06:00) (65 - 119)  BP: 125/86 (04 Aug 2024 06:00) (114/66 - 125/86)  BP(mean): 97 (04 Aug 2024 06:00) (81 - 97)  RR: 19 (04 Aug 2024 06:00) (14 - 23)  SpO2: 98% (04 Aug 2024 06:00) (97% - 100%)    Parameters below as of 04 Aug 2024 04:00  Patient On (Oxygen Delivery Method): room air        Constitutional: NAD  HEENT: neck incision dressing c/d/i  Respiratory: Normal work of breathing, no accessory muscle use  Extremities: No peripheral edema, No cyanosis, clubbing   Neurological: A&O x 3  Psychiatric: Normal mood, normal affect      I&O's Detail    03 Aug 2024 07:01  -  04 Aug 2024 07:00  --------------------------------------------------------  IN:    IV PiggyBack: 50 mL    Oral Fluid: 2960 mL    sodium chloride 0.9%: 300 mL  Total IN: 3310 mL    OUT:    Indwelling Catheter - Urethral (mL): 300 mL    Voided (mL): 4300 mL  Total OUT: 4600 mL    Total NET: -1290 mL          LABS:                        11.6   9.69  )-----------( 257      ( 04 Aug 2024 05:48 )             35.2     08-04    139  |  104  |  6.6<L>  ----------------------------<  109<H>  4.1   |  21.0<L>  |  0.53    Ca    9.1      04 Aug 2024 05:48  Phos  3.1     08-04  Mg     2.2     08-04    TPro  6.3<L>  /  Alb  3.6  /  TBili  0.4  /  DBili  x   /  AST  15  /  ALT  20  /  AlkPhos  55  08-03      Urinalysis Basic - ( 04 Aug 2024 05:48 )    Color: Yellow / Appearance: Clear / S.008 / pH: x  Gluc: 109 mg/dL / Ketone: Negative mg/dL  / Bili: Negative / Urobili: 1.0 mg/dL   Blood: x / Protein: Negative mg/dL / Nitrite: Negative   Leuk Esterase: Trace / RBC: 2 /HPF / WBC 4 /HPF   Sq Epi: x / Non Sq Epi: 4 /HPF / Bacteria: Occasional /HPF    MRI Brain:   FINDINGS:  The patient is status post transsphenoidal approach resection of pituitary microadenoma. Postsurgical changes and fat packing is demonstrated in the sphenoid sinuses. Hypoenhancement in the left aspect of the sella on image 22 of series 7 measures 3.8 mm suggest postsurgical changes. Can't exclude residual adenoma. Continued routine follow-up imaging is recommended with serology correlation.  No abnormal leptomeningeal or parenchymal enhancement. 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  There is no abnormal restricted diffusion to suggest acute infarction. No abnormal signal is demonstrated throughout the brain parenchyma. Normal T2 flow-voids are seen within the intracranial vasculature. The lateral ventricles and cortical sulci are age-appropriate in size and configuration. There is no mass, mass effect, or extra-axial fluid collection. There is no susceptibility artifact to suggest hemorrhage. Midline structures are normal. The mastoid air cells and orbits are unremarkable.

## 2024-08-04 NOTE — CHART NOTE - NSCHARTNOTEFT_GEN_A_CORE
NEURO SURGERY PA POST-OPERATIVE NOTE    Procedure: transsphenoidal pituitary adenomectomy with free mucosal graft    Diagnosis/Indication: Pituitary microadenoma    Surgeon: Dr. Garsia and Dr. Bain    INTERVAL HPI/ACUTE EVENTS:  23yFemale PMH admitted with now POD#0. Patient seen lying comfortably in bed in PACU.   Only complaint is sinus pressure. Otherwise tolerating fluids and feeling less groggy from anesthesia.     VITALS:  T(C): 36.4 (08-02-24 @ 11:00), Max: 37.1 (08-02-24 @ 06:12)  HR: 96 (08-02-24 @ 12:00) (83 - 103)  BP: 124/78 (08-02-24 @ 12:00) (124/78 - 138/87)  RR: 16 (08-02-24 @ 12:00) (14 - 18)  SpO2: 98% (08-02-24 @ 12:00) (98% - 100%)  Wt(kg): --    PHYSICAL EXAM:  GENERAL: NAD, well-groomed, well-developed  MENTAL STATUS: AAO x3; Awake; Opens eyes spontaneously; Appropriately conversant without aphasia  CRANIAL NERVES: Wears glasses visual. fields full to confrontation, PERRL. EOMI without nystagmus. Face symmetric. Hearing grossly intact. Speech clear.  MOTOR: strength 5/5 b/l upper and lower extremities  SENSATION: grossly intact to light touch all extremities    LABS:  PM cortisol levels tonight  and AM cortisol levels tomorrow morning  Am routine labs       RADIOLOGY/OTHER:  MR brain w/wout in AM    Assessment/Plan   24 yo F with hx of pituitary microadenoma, cushings, heart palpitations, s/p cerebral angio 5/2024, anxiety, presents for elective transsphenoidal pituitary adenomectomy with free mucosal graft.   Pt vitals in PACU stable. Pt surgically stable at this time.      Plan:  Neuro Checks: Q1   -Abx: ancef x 24 hours   -Dvt ppx: scds, can start lovenox tomorrow   -SBP Parameters: < 150   -HOB: >30 degrees   -Imaging: MRI w/wout tomorrow   -vale keep for now   -avoid steroids   -skull base precautions- Pt to not use straws, IS, vasovagal manurers, nasal cannulas, NG tubes.   -endo consult  - Cortisol PM levels tonight and AM cortisol levels  - ISS  - diet CC- advance as tolerated   - Is and Os, monitor for SIADH and DI    Case and plan discussed with Dr. Garsia
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 States she experienced a traumatic event in 07/2023, was suffering from worsening anxiety. States she also was noticing worsening fatigue with weight gain prompting follow up with endocrinology who found her cortisol levels to be elevated. Cerebral angiogram done with bilateral inferior petrosal sinus sampling. Testing has showed elevations in ACTH from the left pituitary, confirming central Cushing's disease from the left microadenoma. Patient reports intermittent headaches, described as pressure, 7/10 in severity, worse with loud noises and bright lights, relieved with NSAIDs. Denies fevers, chills, nausea or vomiting. Patient is scheduled for endoscopic endonasal transsphenoidal pituitary adenoma resection, transsphenoidal resection, pituitary microadenoma, possible nasoseptal flap, possible free mucosal graft (CECILY) on 8/2/24 with Dr. Garsia, secondary surgeon Dr. Bain.  (26 Jul 2024 09:51)    OVERNIGHT/INTERVAL EVENTS: Patient doing well, denies any acute complaints. Mustache dressing remains in place for post-operative nasal drip.     HOSPITAL COURSE:   8/2: s/p endoscopic endonasal transsphenoidal pituitary adenomectomy with free mucosal graft  8/3: increased urine output, given 0.25 mcg DDAVP      Vital Signs Last 24 Hrs  T(C): 36.6 (04 Aug 2024 07:49), Max: 36.7 (03 Aug 2024 16:35)  T(F): 97.8 (04 Aug 2024 07:49), Max: 98.1 (03 Aug 2024 16:35)  HR: 100 (04 Aug 2024 11:00) (65 - 100)  BP: 118/80 (04 Aug 2024 11:00) (114/66 - 125/86)  BP(mean): 93 (04 Aug 2024 11:00) (78 - 97)  RR: 19 (04 Aug 2024 11:00) (14 - 23)  SpO2: 97% (04 Aug 2024 11:00) (97% - 99%)    Parameters below as of 04 Aug 2024 08:00  Patient On (Oxygen Delivery Method): room air    I&O's Summary    03 Aug 2024 07:01  -  04 Aug 2024 07:00  --------------------------------------------------------  IN: 3410 mL / OUT: 4600 mL / NET: -1190 mL    04 Aug 2024 07:01  -  04 Aug 2024 12:53  --------------------------------------------------------  IN: 960 mL / OUT: 1200 mL / NET: -240 mL      PHYSICAL EXAM:  General: NAD, pt is comfortably sitting up in bed, on room air  HEENT: EOMI b/l, face symmetric, tongue midline, neck FROM  Cardiovascular: Regular rate and rhythm  Respiratory: nonlabored breathing, normal chest rise  GI: abdomen soft, nontender, nondistended  Neuro: CN II-XII grossly intact, PERRL 3mm briskly reactive   A&Ox3, No aphasia, speech clear, no dysmetria, no pronator drift. Follows commands.  HAWK x4 spontaneously, 5/5 strength in all extremities throughout. sensation intact  Extremities: distal pulses 2+ x4    LABS:                        11.6   9.69  )-----------( 257      ( 04 Aug 2024 05:48 )             35.2     08-04    139  |  103  |  7.2<L>  ----------------------------<  99  4.0   |  22.0  |  0.54    Ca    9.0      04 Aug 2024 11:15  Phos  3.1     08-04  Mg     2.2     08-04  TPro  6.3<L>  /  Alb  3.6  /  TBili  0.4  /  DBili  x   /  AST  15  /  ALT  20  /  AlkPhos  55  08-03  Urinalysis Basic - ( 04 Aug 2024 11:15 )  Color: x / Appearance: x / SG: x / pH: x  Gluc: 99 mg/dL / Ketone: x  / Bili: x / Urobili: x   Blood: x / Protein: x / Nitrite: x   Leuk Esterase: x / RBC: x / WBC x   Sq Epi: x / Non Sq Epi: x / Bacteria: x    CAPILLARY BLOOD GLUCOSE  POCT Blood Glucose.: 102 mg/dL (03 Aug 2024 17:42)    Allergies  No Known Allergies    MEDICATIONS:  acetaminophen     Tablet .. 650 milliGRAM(s) Oral every 6 hours PRN  ondansetron Injectable 4 milliGRAM(s) IV Push every 6 hours PRN  oxyCODONE    IR 5 milliGRAM(s) Oral every 4 hours PRN  oxyCODONE    IR 10 milliGRAM(s) Oral every 4 hours PRN  enoxaparin Injectable 40 milliGRAM(s) SubCutaneous every 24 hours  chlorhexidine 2% Cloths 1 Application(s) Topical daily  hydrALAZINE Injectable 10 milliGRAM(s) IV Push every 2 hours PRN  labetalol Injectable 10 milliGRAM(s) IV Push every 2 hours PRN  polyethylene glycol 3350 17 Gram(s) Oral daily  senna 2 Tablet(s) Oral at bedtime  sodium chloride 0.65% Nasal 1 Spray(s) Both Nostrils every 4 hours    RADIOLOGY & ADDITIONAL TESTS:  < from: MR Head w/wo IV Cont (08.03.24 @ 11:00) >  IMPRESSION: No acute intracranial pathology. Postsurgical changes, as   above.      ASSESSMENT:  23F PMHx of pituitary microadenoma, Cushing's disease, anxiety, s/p elective endoscopic endonasal transsphenoidal pituitary adenomectomy with free mucosal graft 8/2. Post-operatively had increased urine output given DDAVP x1, remaining on DI watch.    Plan:  Neuro:  - Q2hr neuro checks/vital signs   - HOB 30 degrees, Neck midline position  - postop MRI Brain w/wo done 8/3, stable postop changes   - Pain control PRN, avoid oversedation  - Skull base precautions: No straws, nasal swabs, incentive spirometry, no nasal cannula, CPAP/BIPAP/High flow, NGT, nose blowing  	  CV:  - SBP Goal <160  - PRN hydralazine, labetalol    Pulm:  - tolerating RA   - Supplemental O2 PRN to maintain Spo2>92%    GI:  - Regular diet   - Bowel regimen: Senna, Miralax  - pending BM   - PRN: Zofran   	  Gu:  - voiding   - DI watch  - Monitor electrolytes, replete PRN  - BMP BID     Heme:  - DVT ppx: SCDs, SQL   	  ID:  - Monitor WBC and Temperature  - completed post-op Ancef x24hrs    Endo  - Monitor BGL, maintain <180  - Endo consulted    DISPO:   - downgraded to neurosurgery stepdown   - PT/OT: home no needs     Discussed with Dr. Hernández

## 2024-08-05 ENCOUNTER — TRANSCRIPTION ENCOUNTER (OUTPATIENT)
Age: 24
End: 2024-08-05

## 2024-08-05 VITALS
RESPIRATION RATE: 16 BRPM | HEART RATE: 92 BPM | DIASTOLIC BLOOD PRESSURE: 74 MMHG | OXYGEN SATURATION: 99 % | SYSTOLIC BLOOD PRESSURE: 114 MMHG

## 2024-08-05 LAB
ANION GAP SERPL CALC-SCNC: 12 MMOL/L — SIGNIFICANT CHANGE UP (ref 5–17)
ANION GAP SERPL CALC-SCNC: 15 MMOL/L — SIGNIFICANT CHANGE UP (ref 5–17)
BUN SERPL-MCNC: 7 MG/DL — LOW (ref 8–20)
BUN SERPL-MCNC: 8.5 MG/DL — SIGNIFICANT CHANGE UP (ref 8–20)
CALCIUM SERPL-MCNC: 9 MG/DL — SIGNIFICANT CHANGE UP (ref 8.4–10.5)
CALCIUM SERPL-MCNC: 9.3 MG/DL — SIGNIFICANT CHANGE UP (ref 8.4–10.5)
CHLORIDE SERPL-SCNC: 102 MMOL/L — SIGNIFICANT CHANGE UP (ref 96–108)
CHLORIDE SERPL-SCNC: 99 MMOL/L — SIGNIFICANT CHANGE UP (ref 96–108)
CO2 SERPL-SCNC: 22 MMOL/L — SIGNIFICANT CHANGE UP (ref 22–29)
CO2 SERPL-SCNC: 23 MMOL/L — SIGNIFICANT CHANGE UP (ref 22–29)
CREAT SERPL-MCNC: 0.59 MG/DL — SIGNIFICANT CHANGE UP (ref 0.5–1.3)
CREAT SERPL-MCNC: 0.61 MG/DL — SIGNIFICANT CHANGE UP (ref 0.5–1.3)
EGFR: 127 ML/MIN/1.73M2 — SIGNIFICANT CHANGE UP
EGFR: 128 ML/MIN/1.73M2 — SIGNIFICANT CHANGE UP
GLUCOSE SERPL-MCNC: 101 MG/DL — HIGH (ref 70–99)
GLUCOSE SERPL-MCNC: 124 MG/DL — HIGH (ref 70–99)
HCT VFR BLD CALC: 34.6 % — SIGNIFICANT CHANGE UP (ref 34.5–45)
HGB BLD-MCNC: 11.2 G/DL — LOW (ref 11.5–15.5)
MAGNESIUM SERPL-MCNC: 2.2 MG/DL — SIGNIFICANT CHANGE UP (ref 1.8–2.6)
MCHC RBC-ENTMCNC: 28.6 PG — SIGNIFICANT CHANGE UP (ref 27–34)
MCHC RBC-ENTMCNC: 32.4 GM/DL — SIGNIFICANT CHANGE UP (ref 32–36)
MCV RBC AUTO: 88.5 FL — SIGNIFICANT CHANGE UP (ref 80–100)
PHOSPHATE SERPL-MCNC: 4.2 MG/DL — SIGNIFICANT CHANGE UP (ref 2.4–4.7)
PLATELET # BLD AUTO: 259 K/UL — SIGNIFICANT CHANGE UP (ref 150–400)
POTASSIUM SERPL-MCNC: 3.9 MMOL/L — SIGNIFICANT CHANGE UP (ref 3.5–5.3)
POTASSIUM SERPL-MCNC: 4 MMOL/L — SIGNIFICANT CHANGE UP (ref 3.5–5.3)
POTASSIUM SERPL-SCNC: 3.9 MMOL/L — SIGNIFICANT CHANGE UP (ref 3.5–5.3)
POTASSIUM SERPL-SCNC: 4 MMOL/L — SIGNIFICANT CHANGE UP (ref 3.5–5.3)
RBC # BLD: 3.91 M/UL — SIGNIFICANT CHANGE UP (ref 3.8–5.2)
RBC # FLD: 12.6 % — SIGNIFICANT CHANGE UP (ref 10.3–14.5)
SODIUM SERPL-SCNC: 134 MMOL/L — LOW (ref 135–145)
SODIUM SERPL-SCNC: 139 MMOL/L — SIGNIFICANT CHANGE UP (ref 135–145)
WBC # BLD: 8.77 K/UL — SIGNIFICANT CHANGE UP (ref 3.8–10.5)
WBC # FLD AUTO: 8.77 K/UL — SIGNIFICANT CHANGE UP (ref 3.8–10.5)

## 2024-08-05 PROCEDURE — 88360 TUMOR IMMUNOHISTOCHEM/MANUAL: CPT

## 2024-08-05 PROCEDURE — 88307 TISSUE EXAM BY PATHOLOGIST: CPT

## 2024-08-05 PROCEDURE — 84100 ASSAY OF PHOSPHORUS: CPT

## 2024-08-05 PROCEDURE — 80048 BASIC METABOLIC PNL TOTAL CA: CPT

## 2024-08-05 PROCEDURE — 86901 BLOOD TYPING SEROLOGIC RH(D): CPT

## 2024-08-05 PROCEDURE — 84449 ASSAY OF TRANSCORTIN: CPT

## 2024-08-05 PROCEDURE — 85014 HEMATOCRIT: CPT

## 2024-08-05 PROCEDURE — 82947 ASSAY GLUCOSE BLOOD QUANT: CPT

## 2024-08-05 PROCEDURE — 85027 COMPLETE CBC AUTOMATED: CPT

## 2024-08-05 PROCEDURE — 82330 ASSAY OF CALCIUM: CPT

## 2024-08-05 PROCEDURE — 84479 ASSAY OF THYROID (T3 OR T4): CPT

## 2024-08-05 PROCEDURE — 86850 RBC ANTIBODY SCREEN: CPT

## 2024-08-05 PROCEDURE — 99232 SBSQ HOSP IP/OBS MODERATE 35: CPT

## 2024-08-05 PROCEDURE — 80053 COMPREHEN METABOLIC PANEL: CPT

## 2024-08-05 PROCEDURE — 84132 ASSAY OF SERUM POTASSIUM: CPT

## 2024-08-05 PROCEDURE — 86900 BLOOD TYPING SEROLOGIC ABO: CPT

## 2024-08-05 PROCEDURE — C1769: CPT

## 2024-08-05 PROCEDURE — 80061 LIPID PANEL: CPT

## 2024-08-05 PROCEDURE — 84436 ASSAY OF TOTAL THYROXINE: CPT

## 2024-08-05 PROCEDURE — 82533 TOTAL CORTISOL: CPT

## 2024-08-05 PROCEDURE — 84295 ASSAY OF SERUM SODIUM: CPT

## 2024-08-05 PROCEDURE — 70553 MRI BRAIN STEM W/O & W/DYE: CPT | Mod: MC

## 2024-08-05 PROCEDURE — 84300 ASSAY OF URINE SODIUM: CPT

## 2024-08-05 PROCEDURE — 83935 ASSAY OF URINE OSMOLALITY: CPT

## 2024-08-05 PROCEDURE — 83605 ASSAY OF LACTIC ACID: CPT

## 2024-08-05 PROCEDURE — 84443 ASSAY THYROID STIM HORMONE: CPT

## 2024-08-05 PROCEDURE — 82024 ASSAY OF ACTH: CPT

## 2024-08-05 PROCEDURE — C1889: CPT

## 2024-08-05 PROCEDURE — 82962 GLUCOSE BLOOD TEST: CPT

## 2024-08-05 PROCEDURE — 88313 SPECIAL STAINS GROUP 2: CPT

## 2024-08-05 PROCEDURE — 36415 COLL VENOUS BLD VENIPUNCTURE: CPT

## 2024-08-05 PROCEDURE — 83735 ASSAY OF MAGNESIUM: CPT

## 2024-08-05 PROCEDURE — 82803 BLOOD GASES ANY COMBINATION: CPT

## 2024-08-05 PROCEDURE — 82435 ASSAY OF BLOOD CHLORIDE: CPT

## 2024-08-05 PROCEDURE — 85018 HEMOGLOBIN: CPT

## 2024-08-05 PROCEDURE — 81001 URINALYSIS AUTO W/SCOPE: CPT

## 2024-08-05 PROCEDURE — 83930 ASSAY OF BLOOD OSMOLALITY: CPT

## 2024-08-05 RX ORDER — ACETAMINOPHEN 500 MG
2 TABLET ORAL
Qty: 0 | Refills: 0 | DISCHARGE
Start: 2024-08-05

## 2024-08-05 RX ORDER — SODIUM CHLORIDE 0.65 %
1 AEROSOL, SPRAY (ML) NASAL
Qty: 0 | Refills: 0 | DISCHARGE
Start: 2024-08-05

## 2024-08-05 RX ADMIN — ENOXAPARIN SODIUM 40 MILLIGRAM(S): 120 INJECTION SUBCUTANEOUS at 06:07

## 2024-08-05 RX ADMIN — Medication 1 SPRAY(S): at 06:07

## 2024-08-05 RX ADMIN — Medication 1 SPRAY(S): at 02:28

## 2024-08-05 RX ADMIN — Medication 1 SPRAY(S): at 09:49

## 2024-08-05 NOTE — DISCHARGE NOTE NURSING/CASE MANAGEMENT/SOCIAL WORK - PATIENT PORTAL LINK FT
You can access the FollowMyHealth Patient Portal offered by [facility] by registering at the following website: [email or web address]. By joining [facility]’s FollowMyHealth portal, you will also be able to view your health information using other applications (apps) compatible with our system.

## 2024-08-05 NOTE — PROGRESS NOTE ADULT - NS ATTEND AMEND GEN_ALL_CORE FT
By the time I went to examine the patient, she was discharged. However, based on reviewing the charts and the sign out from Dr. Valverde, there is a lot of missing information in the patient case. Questionable cushing disease. However, not sure if the cushing diagnosis was confirmed to begin with, or if IPSS was done with desmopressin or if it was a valid study. But still, free cortisol level pending (the estrogen content of the OCP can cause a false elevation of cortisol due to a rise in cortisol binding globulin). Will see her outpatient to discuss further. No replacements so far

## 2024-08-05 NOTE — PROGRESS NOTE ADULT - PROBLEM SELECTOR PLAN 1
-Skullbase precautions   -Monitor for CSF leak   -Nasal saline sprays q 2 hours keep at bedside for frequent use  ENT will follow closely.    Seen w Dr. Bain
-Skullbase precautions   -Monitor for CSF leak   -Nasal saline sprays q 2 hours keep at bedside for frequent use  ENT will follow closely

## 2024-08-05 NOTE — DIETITIAN INITIAL EVALUATION ADULT - PERTINENT MEDS FT
MEDICATIONS  (STANDING):  enoxaparin Injectable 40 milliGRAM(s) SubCutaneous every 24 hours  polyethylene glycol 3350 17 Gram(s) Oral daily  senna 2 Tablet(s) Oral at bedtime  sodium chloride 0.65% Nasal 1 Spray(s) Both Nostrils every 4 hours    MEDICATIONS  (PRN):  acetaminophen     Tablet .. 650 milliGRAM(s) Oral every 6 hours PRN Mild Pain (1 - 3)  hydrALAZINE Injectable 10 milliGRAM(s) IV Push every 2 hours PRN SBP > 160  labetalol Injectable 10 milliGRAM(s) IV Push every 2 hours PRN SBP>160  ondansetron Injectable 4 milliGRAM(s) IV Push every 6 hours PRN Nausea and/or Vomiting  oxyCODONE    IR 10 milliGRAM(s) Oral every 4 hours PRN Severe Pain (7 - 10)

## 2024-08-05 NOTE — PROGRESS NOTE ADULT - SUBJECTIVE AND OBJECTIVE BOX
ENT ISSUE/POD: s/p transphenoidal resection of pituitary adenoma POD3    Interval HPI: No new complaints. Denies salty/metallic taste or vision changes       PAST MEDICAL & SURGICAL HISTORY:  Microadenoma      History of pineal cyst      H/O fatigue      History of weight gain      Heart palpitations      PAC (premature atrial contraction)      H/O Cushing disease      Benign neoplasm of pituitary gland      Impaired fasting glucose      H/O rhinoplasty      History of functional implant, prosthetic, or other device        Allergies    No Known Allergies    Intolerances      MEDICATIONS  (STANDING):  enoxaparin Injectable 40 milliGRAM(s) SubCutaneous every 24 hours  polyethylene glycol 3350 17 Gram(s) Oral daily  senna 2 Tablet(s) Oral at bedtime  sodium chloride 0.65% Nasal 1 Spray(s) Both Nostrils every 4 hours    MEDICATIONS  (PRN):  acetaminophen     Tablet .. 650 milliGRAM(s) Oral every 6 hours PRN Mild Pain (1 - 3)  hydrALAZINE Injectable 10 milliGRAM(s) IV Push every 2 hours PRN SBP > 160  labetalol Injectable 10 milliGRAM(s) IV Push every 2 hours PRN SBP>160  ondansetron Injectable 4 milliGRAM(s) IV Push every 6 hours PRN Nausea and/or Vomiting  oxyCODONE    IR 5 milliGRAM(s) Oral every 4 hours PRN Moderate Pain (4 - 6)  oxyCODONE    IR 10 milliGRAM(s) Oral every 4 hours PRN Severe Pain (7 - 10)      ROS:   ENT: all negative except as noted in HPI   Pulm: denies SOB, cough, hemoptysis  Neuro: denies numbness/tingling, loss of sensation  Endo: denies heat/cold intolerance, excessive sweating      Vital Signs Last 24 Hrs  T(C): 36.6 (04 Aug 2024 20:00), Max: 36.7 (04 Aug 2024 16:00)  T(F): 97.8 (04 Aug 2024 20:00), Max: 98 (04 Aug 2024 16:00)  HR: 79 (05 Aug 2024 07:00) (69 - 100)  BP: 114/66 (05 Aug 2024 07:00) (109/70 - 130/77)  BP(mean): 81 (05 Aug 2024 07:00) (75 - 93)  RR: 18 (05 Aug 2024 07:00) (14 - 20)  SpO2: 99% (05 Aug 2024 07:00) (97% - 100%)    Parameters below as of 05 Aug 2024 04:00  Patient On (Oxygen Delivery Method): room air                              11.2   8.77  )-----------( 259      ( 05 Aug 2024 04:15 )             34.6    08-05    134<L>  |  99  |  8.5  ----------------------------<  101<H>  3.9   |  23.0  |  0.59    Ca    9.0      05 Aug 2024 04:15  Phos  4.2     08-05  Mg     2.2     08-05         PHYSICAL EXAM:  Gen: NAD  Skin: No rashes, bruises, or lesions  Head: Normocephalic, Atraumatic  Face: no edema, erythema, or fluctuance. Parotid glands soft without mass  Eyes: no scleral injection  Nose: Nares bilaterally patent, no discharge  Mouth: No Stridor / Drooling / Trismus.  Mucosa moist, tongue/uvula midline, oropharynx clear  Neck: Flat, supple, no lymphadenopathy, trachea midline, no masses  Lymphatic: No lymphadenopathy  Resp: breathing easily, no stridor  Neuro: facial nerve intact, no facial droop

## 2024-08-05 NOTE — DIETITIAN INITIAL EVALUATION ADULT - CALCULATED FROM (G/KG)
Prescriptions on File Prior to Visit   Medication Sig Dispense Refill    ipratropium (ATROVENT HFA) 17 MCG/ACT inhaler Inhale 2 puffs into the lungs 4 times daily 1 Inhaler 3    albuterol sulfate HFA (PROVENTIL HFA) 108 (90 Base) MCG/ACT inhaler inhale 2 puffs by mouth every 6 hours if needed for wheezing 6.7 g 3    Handicap Placard [facility] by Does not apply route 1 each 0    diltiazem (CARDIZEM CD) 120 MG extended release capsule Take 1 capsule by mouth daily D/c amlopidine 90 capsule 3    pravastatin (PRAVACHOL) 20 MG tablet take 1 tablet by mouth once daily REPLACE LOVASTATIN 90 tablet 3    predniSONE (DELTASONE) 10 MG tablet Day 1-3: 5 tabs daily; Day 4-6: 4 tabs daily; Day 7-9: 3 tabs daily; Day 10-12: 2 tabs daily; Day 13-15: 1 tab daily 49 tablet 0    Nebulizer MISC Has machine and medicine. Needs cup and tubing supplies 1 each 0    indomethacin (INDOCIN) 50 MG capsule Take 1 capsule by mouth 3 times daily 21 capsule 0    albuterol (PROVENTIL) (2.5 MG/3ML) 0.083% nebulizer solution Take 3 mLs by nebulization every 6 hours 360 each 1    aspirin 81 MG tablet Take 81 mg by mouth daily  30 tablet 3    Cinnamon 500 MG TABS Take  by mouth.  beclomethasone (QVAR) 80 MCG/ACT inhaler Inhale 1 puff into the lungs 2 times daily 1 Inhaler 3     Current Facility-Administered Medications on File Prior to Visit   Medication Dose Route Frequency Provider Last Rate Last Dose    methylPREDNISolone acetate (DEPO-MEDROL) injection 80 mg  80 mg Intramuscular Once Francisco Albert MD             Review of Systems   HENT: Positive for rhinorrhea. Negative for congestion, ear pain, sinus pain and sore throat. Eyes: Positive for itching. Respiratory: Positive for cough, chest tightness, shortness of breath and wheezing. Cardiovascular: Negative. Gastrointestinal: Negative. Negative for nausea. Endocrine: Negative. Genitourinary: Negative. Skin: Negative.     Allergic/Immunologic: Positive for 80.4

## 2024-08-05 NOTE — DIETITIAN INITIAL EVALUATION ADULT - NSICDXPASTSURGICALHX_GEN_ALL_CORE_FT
PAST SURGICAL HISTORY:  H/O rhinoplasty     History of functional implant, prosthetic, or other device

## 2024-08-05 NOTE — PROGRESS NOTE ADULT - ASSESSMENT
23F with PMH of heart palpitations, elevated blood sugars presented for scheduled pituitary adenoma removal. History of reported cushing's syndrome, presenting with fatigue and weight gain. Evaluated by an endocrinologist not in our group (Dr. Madden) and diagnosed with ACTH dependent hypercortisolemia; MRI revealed a 5 mm microadenoma. IPSS performed which suggested lateralization to the side of the adenoma. Menses are intermittent, and pt. is on an estrogen containing OCP. Also endorses recent severe psychological stress, had been on metformin without history of diabetes.    1. Pituitary microadenoma s/p resection  - AM cortisol 25, ACTH 8  - Repeat AM cortisol and ACTH tomorrow  - Check thyroid function  - Continue to monitor sodium level/BMP/I&Os  - Free cortisol level pending (the estrogen content of the OCP can cause a false elevation of cortisol due to a rise in cortisol binding globulin)  - Will arrange outpt follow up with our office

## 2024-08-05 NOTE — DIETITIAN INITIAL EVALUATION ADULT - ORAL INTAKE PTA/DIET HISTORY
Pt reports eating well PTA; denies any recent weight changes. Pt reports being lactose intolerant; does not tolerate milk/cheese. Pt denies difficulty chewing or swallowing at this time; however has to eat slow 2/2 nasal packing making it hard to chew and breathe at same time.

## 2024-08-05 NOTE — DIETITIAN INITIAL EVALUATION ADULT - PERTINENT LABORATORY DATA
08-05    139  |  102  |  7.0<L>  ----------------------------<  124<H>  4.0   |  22.0  |  0.61    Ca    9.3      05 Aug 2024 09:51  Phos  4.2     08-05  Mg     2.2     08-05    A1C with Estimated Average Glucose Result: 4.9 % (07-26-24 @ 09:50)  A1C with Estimated Average Glucose Result: 5.0 % (05-16-24 @ 09:50)

## 2024-08-05 NOTE — PROGRESS NOTE ADULT - SUBJECTIVE AND OBJECTIVE BOX
INTERVAL EVENTS:  Follow up pituitary resection    ROS: Mild nausea, denies excessive thirst     MEDICATIONS  (STANDING):  enoxaparin Injectable 40 milliGRAM(s) SubCutaneous every 24 hours  polyethylene glycol 3350 17 Gram(s) Oral daily  senna 2 Tablet(s) Oral at bedtime  sodium chloride 0.65% Nasal 1 Spray(s) Both Nostrils every 4 hours    MEDICATIONS  (PRN):  acetaminophen     Tablet .. 650 milliGRAM(s) Oral every 6 hours PRN Mild Pain (1 - 3)  hydrALAZINE Injectable 10 milliGRAM(s) IV Push every 2 hours PRN SBP > 160  labetalol Injectable 10 milliGRAM(s) IV Push every 2 hours PRN SBP>160  ondansetron Injectable 4 milliGRAM(s) IV Push every 6 hours PRN Nausea and/or Vomiting  oxyCODONE    IR 5 milliGRAM(s) Oral every 4 hours PRN Moderate Pain (4 - 6)  oxyCODONE    IR 10 milliGRAM(s) Oral every 4 hours PRN Severe Pain (7 - 10)    Allergies  No Known Allergies    Vital Signs Last 24 Hrs  T(C): 36.6 (05 Aug 2024 08:00), Max: 36.7 (04 Aug 2024 16:00)  T(F): 97.8 (05 Aug 2024 08:00), Max: 98 (04 Aug 2024 16:00)  HR: 92 (05 Aug 2024 11:00) (69 - 105)  BP: 114/74 (05 Aug 2024 11:00) (109/70 - 130/81)  BP(mean): 87 (05 Aug 2024 11:00) (75 - 94)  RR: 16 (05 Aug 2024 11:00) (14 - 20)  SpO2: 99% (05 Aug 2024 11:00) (97% - 100%)    Parameters below as of 05 Aug 2024 08:00  Patient On (Oxygen Delivery Method): room air    PHYSICAL EXAM:  General: No apparent distress  Neck: Supple, trachea midline, no thyromegaly  Respiratory: Lungs clear bilaterally  Cardiac: +S1, S2, no m/r/g  GI: +BS, soft, non tender, non distended  Extremities: No peripheral edema  Neuro: A+O X3    LABS:                        11.2   8.77  )-----------( 259      ( 05 Aug 2024 04:15 )             34.6     08-05    139  |  102  |  7.0<L>  ----------------------------<  124<H>  4.0   |  22.0  |  0.61    Ca    9.3      05 Aug 2024 09:51  Phos  4.2     08-05  Mg     2.2     08-05      Urinalysis Basic - ( 05 Aug 2024 09:51 )    Color: x / Appearance: x / SG: x / pH: x  Gluc: 124 mg/dL / Ketone: x  / Bili: x / Urobili: x   Blood: x / Protein: x / Nitrite: x   Leuk Esterase: x / RBC: x / WBC x   Sq Epi: x / Non Sq Epi: x / Bacteria: x    Thyroid Stimulating Hormone, Serum: 1.34 uIU/mL (08-02-24 @ 15:30)

## 2024-08-08 LAB — SURGICAL PATHOLOGY STUDY: SIGNIFICANT CHANGE UP

## 2024-08-09 ENCOUNTER — TRANSCRIPTION ENCOUNTER (OUTPATIENT)
Age: 24
End: 2024-08-09

## 2024-08-12 LAB
CORTICOSTEROID BINDING GLOBULIN RESULT: 5 MG/DL — HIGH
CORTICOSTEROID BINDING GLOBULIN RESULT: 5.1 MG/DL — HIGH
CORTIS F/TOTAL MFR SERPL: 2.3 % — SIGNIFICANT CHANGE UP
CORTIS F/TOTAL MFR SERPL: 29 % — SIGNIFICANT CHANGE UP
CORTIS SERPL-MCNC: 20 UG/DL — HIGH
CORTIS SERPL-MCNC: 47 UG/DL — HIGH
CORTISOL, FREE RESULT: 0.46 UG/DL — SIGNIFICANT CHANGE UP
CORTISOL, FREE RESULT: 13 UG/DL — HIGH

## 2024-08-14 ENCOUNTER — TRANSCRIPTION ENCOUNTER (OUTPATIENT)
Age: 24
End: 2024-08-14

## 2024-08-14 DIAGNOSIS — E87.1 HYPO-OSMOLALITY AND HYPONATREMIA: ICD-10-CM

## 2024-08-15 ENCOUNTER — NON-APPOINTMENT (OUTPATIENT)
Age: 24
End: 2024-08-15

## 2024-08-17 LAB
ANION GAP SERPL CALC-SCNC: 15 MMOL/L
BUN SERPL-MCNC: 13 MG/DL
CALCIUM SERPL-MCNC: 10.2 MG/DL
CHLORIDE SERPL-SCNC: 101 MMOL/L
CO2 SERPL-SCNC: 24 MMOL/L
CREAT SERPL-MCNC: 0.65 MG/DL
EGFR: 125 ML/MIN/1.73M2
GLUCOSE SERPL-MCNC: 90 MG/DL
POTASSIUM SERPL-SCNC: 4.3 MMOL/L
SODIUM SERPL-SCNC: 140 MMOL/L

## 2024-08-19 ENCOUNTER — APPOINTMENT (OUTPATIENT)
Dept: ENDOCRINOLOGY | Facility: CLINIC | Age: 24
End: 2024-08-19
Payer: MEDICAID

## 2024-08-19 VITALS
HEIGHT: 66 IN | WEIGHT: 146.31 LBS | SYSTOLIC BLOOD PRESSURE: 110 MMHG | DIASTOLIC BLOOD PRESSURE: 72 MMHG | BODY MASS INDEX: 23.52 KG/M2 | HEART RATE: 117 BPM | OXYGEN SATURATION: 99 %

## 2024-08-19 PROCEDURE — G2211 COMPLEX E/M VISIT ADD ON: CPT | Mod: NC

## 2024-08-19 PROCEDURE — 99214 OFFICE O/P EST MOD 30 MIN: CPT

## 2024-08-19 NOTE — HISTORY OF PRESENT ILLNESS
[FreeTextEntry1] : 23F here for evaluation of pituitary adenoma    Case  PMHx anxiety, questionable Cushing's syndrome s/p endonasal transsphenoidal resection of pituitary microadenoma. Presentation was complected by SIADH as part of triphasic response.   Patient has undergone endonasal transsphenoidal resection, on 8/2 and came back on 8/9, patient did not require hormone replacement after surgery on discharge. Patient came back on the 8/9 with , that dropped down to 115. was optimized in the hospital and NA normalized.   Before the surgery she has undergone IPSS for questionable Cushing disease, however I was unable to know exactly, the initial work up and was not able to determine if the test was with desmopressin  Off birth control, stopped on 8/11

## 2024-08-19 NOTE — PHYSICAL EXAM
[Alert] : alert [No Neck Mass] : no neck mass was observed [No LAD] : no lymphadenopathy [Thyroid Not Enlarged] : the thyroid was not enlarged [Normal PMI] : the apical impulse was normal [Normal S1, S2] : normal S1 and S2

## 2024-08-19 NOTE — ASSESSMENT
[FreeTextEntry1] : Pituitary macroadenoma s/p resection - Pathology shows pituitary adenoma, stains ruled out Cushing disease  - Normal free cortisol, elevated CBG while on OCP, now off OCP, repeat labs will be causios with interpretting since has been only a week since stopped the ocp  - Hemodynamically stable, currently off steroids. steroids not indicated at this time - Free T4 normal  Check Na and vitamins today  Check the rest of the pituitary labs including Free cortisol 8 am fasting   Worries to much Will let us know if symptoms worsening in the next 2-3 days    Hyponatremia now resolved   on 8/17 Recheck as patient is stating she is dizzy    No history of diabetes Dexcom not concerning    I spent 40 minutes discussing with patient face to face and non-face to face reviewing documentations, labs, and/or imaging, also discussing the management plans.  RTC in 2-3 months for 20 min

## 2024-08-20 ENCOUNTER — NON-APPOINTMENT (OUTPATIENT)
Age: 24
End: 2024-08-20

## 2024-08-20 LAB
25(OH)D3 SERPL-MCNC: 43.5 NG/ML
ALBUMIN SERPL ELPH-MCNC: 4.5 G/DL
ALP BLD-CCNC: 98 U/L
ALT SERPL-CCNC: 30 U/L
ANION GAP SERPL CALC-SCNC: 16 MMOL/L
AST SERPL-CCNC: 20 U/L
BASOPHILS # BLD AUTO: 0.05 K/UL
BASOPHILS NFR BLD AUTO: 0.5 %
BILIRUB SERPL-MCNC: <0.2 MG/DL
BUN SERPL-MCNC: 17 MG/DL
CALCIUM SERPL-MCNC: 9.5 MG/DL
CHLORIDE SERPL-SCNC: 105 MMOL/L
CO2 SERPL-SCNC: 21 MMOL/L
CREAT SERPL-MCNC: 0.71 MG/DL
EGFR: 121 ML/MIN/1.73M2
EOSINOPHIL # BLD AUTO: 0.11 K/UL
EOSINOPHIL NFR BLD AUTO: 1.1 %
FOLATE SERPL-MCNC: 12.2 NG/ML
GLUCOSE SERPL-MCNC: 101 MG/DL
HCT VFR BLD CALC: 40.2 %
HGB BLD-MCNC: 12.6 G/DL
IMM GRANULOCYTES NFR BLD AUTO: 0.6 %
LYMPHOCYTES # BLD AUTO: 1.99 K/UL
LYMPHOCYTES NFR BLD AUTO: 19.7 %
MAN DIFF?: NORMAL
MCHC RBC-ENTMCNC: 29.1 PG
MCHC RBC-ENTMCNC: 31.3 GM/DL
MCV RBC AUTO: 92.8 FL
MONOCYTES # BLD AUTO: 0.51 K/UL
MONOCYTES NFR BLD AUTO: 5 %
NEUTROPHILS # BLD AUTO: 7.4 K/UL
NEUTROPHILS NFR BLD AUTO: 73.1 %
PLATELET # BLD AUTO: 354 K/UL
POTASSIUM SERPL-SCNC: 4 MMOL/L
PROT SERPL-MCNC: 7.4 G/DL
RBC # BLD: 4.33 M/UL
RBC # FLD: 12.7 %
SODIUM SERPL-SCNC: 142 MMOL/L
VIT B12 SERPL-MCNC: 871 PG/ML
WBC # FLD AUTO: 10.12 K/UL

## 2024-08-22 ENCOUNTER — APPOINTMENT (OUTPATIENT)
Dept: NEUROLOGY | Facility: CLINIC | Age: 24
End: 2024-08-22
Payer: MEDICAID

## 2024-08-22 VITALS
HEIGHT: 66.5 IN | OXYGEN SATURATION: 99 % | BODY MASS INDEX: 23.19 KG/M2 | WEIGHT: 146 LBS | HEART RATE: 99 BPM | DIASTOLIC BLOOD PRESSURE: 80 MMHG | SYSTOLIC BLOOD PRESSURE: 123 MMHG

## 2024-08-22 PROCEDURE — G2211 COMPLEX E/M VISIT ADD ON: CPT | Mod: NC

## 2024-08-22 PROCEDURE — 99215 OFFICE O/P EST HI 40 MIN: CPT

## 2024-08-22 NOTE — HISTORY OF PRESENT ILLNESS
[FreeTextEntry1] : Ms. 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 She is now status post Transsphenoidal pituitary microadenoma resection on 8/2/2024, complicated by hyponatremia, discharged on 8/15/2024. Since discharge, she has had recurrence of daily headaches, progressively worsening. She went to the ED yesterday, CTH unremarkable. She was given steroids which broke her headache cycle. She describes the headaches as a tight "band-like" pain around her head with frontal throbbing associated with light/sound sensitivity. No additional neurological symptoms.

## 2024-08-22 NOTE — CONSULT LETTER
[Dear  ___] : Dear  [unfilled], [Consult Letter:] : I had the pleasure of evaluating your patient, [unfilled]. [Please see my note below.] : Please see my note below. [Consult Closing:] : Thank you very much for allowing me to participate in the care of this patient.  If you have any questions, please do not hesitate to contact me. [Sincerely,] : Sincerely, [FreeTextEntry3] : John Casanova MD Chief, Vascular Neurology and Neurology Service , NeuroEndovascular Surgery Professor of Neurology and Radiology [facility] [employer] at [facility] Director, NeuroEndovascular Surgery [facility]

## 2024-08-22 NOTE — DISCUSSION/SUMMARY
[FreeTextEntry1] : Ms. 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 No focal neurological deficits on exam. In regard to her headaches, they sound like migraines. Will trial Qulipta 60 mg daily for preventative therapy and Ubrlevy 100 mg as needed. Patient was instructed on directions for use and side effects. I recommend she establish care with Dr. Rivera, headache specialist, for further management. She will follow-up with me in two months. All of the patient and her mother's questions and concerns were addressed.

## 2024-08-23 ENCOUNTER — APPOINTMENT (OUTPATIENT)
Dept: NEUROLOGY | Facility: CLINIC | Age: 24
End: 2024-08-23
Payer: MEDICAID

## 2024-08-23 VITALS
SYSTOLIC BLOOD PRESSURE: 130 MMHG | DIASTOLIC BLOOD PRESSURE: 78 MMHG | BODY MASS INDEX: 23.19 KG/M2 | WEIGHT: 146 LBS | OXYGEN SATURATION: 99 % | HEIGHT: 66.5 IN | HEART RATE: 92 BPM

## 2024-08-23 DIAGNOSIS — G43.011 MIGRAINE W/OUT AURA, INTRACTABLE, WITH STATUS MIGRAINOSUS: ICD-10-CM

## 2024-08-23 PROCEDURE — 99214 OFFICE O/P EST MOD 30 MIN: CPT

## 2024-08-23 NOTE — HISTORY OF PRESENT ILLNESS
[FreeTextEntry1] : CC: head pressure     Patient Referred by:     HPI: 22 y/o woman with hx of pituitary adenoma. She had cerebral agio May 22nd , started having headache right after the procedure. She saw Dr. Casanova and did 5 day course of nabumetone.   No hx of headaches prior to the procedure. She went to the ER 2 days ago. She had Tylenol IV, reglan, dexamethasone, and sumatriptan. She is not allowed to take NSAIDS yet after her surgery. Her pituitary adenoma was surgical resection Aug 02. She hasn't been a belt to sleep because of the pain and pressure which has been making things worse.  HAs began: May Location: sinuses, whole head, can radiate to the neck Quality: pressure, throbbing, piercing pain Severity: 11/10 Disability: Duration: hours  Frequency:18 days in past month,  Temporal course: Time of day: Pain Free between Attacks: Triggers: poor sleep, bright lights,  loud noise Relieving factors: Exacerbating factors: exercise and Valsalva maneuver make headache worse Prodrome or postdrome:   Aura: none   Associated with menses: possible   Ass'd Symptoms: anorexia Nausea + Vomiting- Photophobia + Phonophobia:+ Osmophobia  brain fog/difficulty concentrating + Irritability + Allodynia - Blurred Vision Neck pain Dizziness   Autonomic features: Eye tearing Nose running Eye redness Foreign body sensation   Additional Social/Work History: Occupation: out of work due to surgery Habits: Caffeine: none ETOH:   none     Tobacco:  none     Drugs: none Exercise: not recently  Diet: eats breakfast, not as good recently  hydration: 1200ml a day Ophthalmologic: Sleep: poor  Dental: chattering teeth, clenching Sinus/Allergies: none Head Trauma: cerebral angio Hypermobility: Psychiatric:   Sex Active/Pregnancy planning: none                      contraception: was recently taken off birth control  Menstruation: irregular                                      age of menarche:                                 age of menopause:    Treatment present: Acute: ubrelvy, qulipta Preventive: none   Prior medications: sumatriptan 100mg    Non-pharmacologic Tx:   Imaging: CTH unremarkabel     Labs:   Childhood precursors:  none  FH: father with bad migraines

## 2024-08-23 NOTE — ASSESSMENT
[FreeTextEntry1] : 24 y/o woman with headache and mainly sinus pressure follow cerebral angiogram in May for pituitary adenoma. Worse since resection 2 weeks ago. She has had headache the past 8 days. Likely headache attributed to cerebral angiogram or pituitary apoplexy, now status migrainosus.     Imaging not indicated at this time. If there is a change in headache features or patient's condition then will re-consider imaging.   Acute: rizatriptan 10mg as needed. Take at onset of headache, usually with 15-20 minutes for best effect. Can redose after 2 hours if headache has not resolved. Limit use to 2 days a week. Zofran disintegrating 4mg as needed. Medrol dose pack for 6 days Ubrelvy 50-100mg as needed.   Prevention: continue Qulipta 60mg daily. Discussed to take with fluid or food to help with swallowing. Dissolving in water has unknown efficacy.    Advised to keep headache journal to track headache frequency, triggers, and response to treatment.   Discussed common side effects of prescribed medications and potential alternatives.    Counseled patient on the importance of maintaining a healthy lifestyle, including balanced diet, adequate hydration, stress management, proper sleep hygiene, and physical activity.   No pregnancy planning at this time. Discussed that if they are considering pregnancy or if they become pregnant medications should be stopped 1 month prior, except for CGRP monoclonal antibody antagonists which should be stopped 6 months prior to pregnancy planning.    6 week televisit follow up.    Answered all questions and concerns to the best of my ability. Advised to call for any new or worsening symptoms.    In order to maintain continuity of care/prescription refills, patients must be seen on a yearly basis.   Total time spent on the day of the visit, including pre-visit and post-visit time was *** minutes.

## 2024-08-23 NOTE — PHYSICAL EXAM
[FreeTextEntry1] :  GENERAL PHYSICAL EXAM: GEN: no acute distress, normal affect EYES:  sclera white, conjunctiva clear PULM: no respiratory distress, normal rate EXT: no edema, no cyanosis Cranial:  no supraorbital, temporal, or occipital region tenderness. No popping or clicking of TMJ Neck: Normal ROM, bilateral trapezius muscle spasm, no tenderness SKIN: warm, dry, no rash or lesion on exposed skin   NEUROLOGICAL EXAM: Mental Status Orientation: alert and oriented to person, place, time, and situation Language: clear and fluent, intact comprehension and repetition   Cranial Nerves II: visual fields full to confrontation III, IV, VI:  PERRL, EOMI, VFF, no nystagmus, no ptosis V, VII: facial sensation and movement intact and symmetric VIII: hearing intact to finger rub bilat IX, X: uvula midline, soft palate elevates normally XI: BL shoulder shrug intact, lateral head movement 5/5 bilat XII: tongue midline   Motor: Bilateral muscle strength 5/5 in UE and LE, proximally and distally, symmetric throughout Tone and bulk are normal in upper and lower limbs. No abnormal movements   Sensation: Intact to light touch and temp in all 4 EXTs,   Coordination: Normal FNF bilateral, Negative Romberg   Reflex: 2+ in BL biceps, brachioradialis, triceps, patella, Achilles. Babinski absent bilat   Gait Normal stance, stride,

## 2024-08-24 ENCOUNTER — EMERGENCY (EMERGENCY)
Facility: HOSPITAL | Age: 24
LOS: 1 days | Discharge: HOME | End: 2024-08-24
Attending: EMERGENCY MEDICINE
Payer: COMMERCIAL

## 2024-08-24 VITALS
DIASTOLIC BLOOD PRESSURE: 74 MMHG | SYSTOLIC BLOOD PRESSURE: 130 MMHG | HEART RATE: 117 BPM | WEIGHT: 144.4 LBS | HEIGHT: 67 IN | OXYGEN SATURATION: 100 % | TEMPERATURE: 98 F | RESPIRATION RATE: 18 BRPM

## 2024-08-24 DIAGNOSIS — Z98.890 OTHER SPECIFIED POSTPROCEDURAL STATES: Chronic | ICD-10-CM

## 2024-08-24 LAB
ALBUMIN SERPL ELPH-MCNC: 4.3 G/DL — SIGNIFICANT CHANGE UP (ref 3.3–5.2)
ALP SERPL-CCNC: 98 U/L — SIGNIFICANT CHANGE UP (ref 40–120)
ALT FLD-CCNC: 23 U/L — SIGNIFICANT CHANGE UP
ANION GAP SERPL CALC-SCNC: 14 MMOL/L — SIGNIFICANT CHANGE UP (ref 5–17)
AST SERPL-CCNC: 17 U/L — SIGNIFICANT CHANGE UP
BILIRUB SERPL-MCNC: 0.3 MG/DL — LOW (ref 0.4–2)
BUN SERPL-MCNC: 17.4 MG/DL — SIGNIFICANT CHANGE UP (ref 8–20)
CALCIUM SERPL-MCNC: 9.7 MG/DL — SIGNIFICANT CHANGE UP (ref 8.4–10.5)
CHLORIDE SERPL-SCNC: 106 MMOL/L — SIGNIFICANT CHANGE UP (ref 96–108)
CK SERPL-CCNC: 40 U/L — SIGNIFICANT CHANGE UP (ref 25–170)
CO2 SERPL-SCNC: 21 MMOL/L — LOW (ref 22–29)
CREAT SERPL-MCNC: 0.63 MG/DL — SIGNIFICANT CHANGE UP (ref 0.5–1.3)
EGFR: 126 ML/MIN/1.73M2 — SIGNIFICANT CHANGE UP
EGFR: 126 ML/MIN/1.73M2 — SIGNIFICANT CHANGE UP
GLUCOSE SERPL-MCNC: 111 MG/DL — HIGH (ref 70–99)
HCT VFR BLD CALC: 37.9 % — SIGNIFICANT CHANGE UP (ref 34.5–45)
HGB BLD-MCNC: 12.4 G/DL — SIGNIFICANT CHANGE UP (ref 11.5–15.5)
MCHC RBC-ENTMCNC: 28.5 PG — SIGNIFICANT CHANGE UP (ref 27–34)
MCHC RBC-ENTMCNC: 32.7 GM/DL — SIGNIFICANT CHANGE UP (ref 32–36)
MCV RBC AUTO: 87.1 FL — SIGNIFICANT CHANGE UP (ref 80–100)
PLATELET # BLD AUTO: 319 K/UL — SIGNIFICANT CHANGE UP (ref 150–400)
POTASSIUM SERPL-MCNC: 4 MMOL/L — SIGNIFICANT CHANGE UP (ref 3.5–5.3)
POTASSIUM SERPL-SCNC: 4 MMOL/L — SIGNIFICANT CHANGE UP (ref 3.5–5.3)
PROT SERPL-MCNC: 7.5 G/DL — SIGNIFICANT CHANGE UP (ref 6.6–8.7)
RBC # BLD: 4.35 M/UL — SIGNIFICANT CHANGE UP (ref 3.8–5.2)
RBC # FLD: 12.2 % — SIGNIFICANT CHANGE UP (ref 10.3–14.5)
SODIUM SERPL-SCNC: 141 MMOL/L — SIGNIFICANT CHANGE UP (ref 135–145)
WBC # BLD: 15.93 K/UL — HIGH (ref 3.8–10.5)
WBC # FLD AUTO: 15.93 K/UL — HIGH (ref 3.8–10.5)

## 2024-08-24 PROCEDURE — 93010 ELECTROCARDIOGRAM REPORT: CPT

## 2024-08-24 PROCEDURE — 99285 EMERGENCY DEPT VISIT HI MDM: CPT

## 2024-08-25 LAB
ACTH SER-ACNC: 75 PG/ML — HIGH (ref 7.2–63.3)
APPEARANCE UR: CLEAR — SIGNIFICANT CHANGE UP
BACTERIA # UR AUTO: NEGATIVE /HPF — SIGNIFICANT CHANGE UP
BILIRUB UR-MCNC: NEGATIVE — SIGNIFICANT CHANGE UP
COLOR SPEC: YELLOW — SIGNIFICANT CHANGE UP
DIFF PNL FLD: ABNORMAL
GLUCOSE UR QL: NEGATIVE MG/DL — SIGNIFICANT CHANGE UP
KETONES UR-MCNC: NEGATIVE MG/DL — SIGNIFICANT CHANGE UP
LEUKOCYTE ESTERASE UR-ACNC: NEGATIVE — SIGNIFICANT CHANGE UP
NITRITE UR-MCNC: NEGATIVE — SIGNIFICANT CHANGE UP
OSMOLALITY SERPL: 290 MOSMOL/KG — SIGNIFICANT CHANGE UP (ref 275–300)
OSMOLALITY UR: 252 MOSM/KG — LOW (ref 300–1000)
PH UR: 6.5 — SIGNIFICANT CHANGE UP (ref 5–8)
PROLACTIN SERPL-MCNC: 12 NG/ML — SIGNIFICANT CHANGE UP (ref 3.4–24.1)
PROT UR-MCNC: NEGATIVE MG/DL — SIGNIFICANT CHANGE UP
RBC CASTS # UR COMP ASSIST: 6 /HPF — HIGH (ref 0–4)
SODIUM UR-SCNC: 41 MMOL/L — SIGNIFICANT CHANGE UP
SP GR SPEC: 1.01 — SIGNIFICANT CHANGE UP (ref 1–1.03)
SQUAMOUS # UR AUTO: 1 /HPF — SIGNIFICANT CHANGE UP (ref 0–5)
TSH SERPL-MCNC: 1.76 UIU/ML — SIGNIFICANT CHANGE UP (ref 0.27–4.2)
UROBILINOGEN FLD QL: 0.2 MG/DL — SIGNIFICANT CHANGE UP (ref 0.2–1)
WBC UR QL: 0 /HPF — SIGNIFICANT CHANGE UP (ref 0–5)

## 2024-08-25 PROCEDURE — 99222 1ST HOSP IP/OBS MODERATE 55: CPT

## 2024-08-25 PROCEDURE — 99223 1ST HOSP IP/OBS HIGH 75: CPT

## 2024-08-25 RX ORDER — BUTALBITAL, ACETAMINOPHEN AND CAFFEINE 50; 325; 40 MG/1; MG/1; MG/1
1 TABLET ORAL ONCE
Refills: 0 | Status: COMPLETED | OUTPATIENT
Start: 2024-08-25 | End: 2024-08-25

## 2024-08-25 RX ORDER — IBUPROFEN 200 MG
600 TABLET ORAL ONCE
Refills: 0 | Status: COMPLETED | OUTPATIENT
Start: 2024-08-25 | End: 2024-08-25

## 2024-08-25 RX ORDER — IBUPROFEN 200 MG
600 TABLET ORAL EVERY 8 HOURS
Refills: 0 | Status: DISCONTINUED | OUTPATIENT
Start: 2024-08-25 | End: 2024-09-01

## 2024-08-25 RX ORDER — METOCLOPRAMIDE HCL 10 MG
10 TABLET ORAL ONCE
Refills: 0 | Status: COMPLETED | OUTPATIENT
Start: 2024-08-25 | End: 2024-08-25

## 2024-08-25 RX ADMIN — Medication 600 MILLIGRAM(S): at 13:30

## 2024-08-25 RX ADMIN — Medication 600 MILLIGRAM(S): at 22:33

## 2024-08-25 RX ADMIN — Medication 10 MILLIGRAM(S): at 03:03

## 2024-08-25 RX ADMIN — Medication 600 MILLIGRAM(S): at 12:31

## 2024-08-25 RX ADMIN — Medication 600 MILLIGRAM(S): at 03:01

## 2024-08-26 LAB
CORTIS AM PEAK SERPL-MCNC: 20.5 UG/DL — HIGH (ref 6–18.4)
CULTURE RESULTS: SIGNIFICANT CHANGE UP
ESTRADIOL FREE SERPL-MCNC: 31 PG/ML — SIGNIFICANT CHANGE UP
FSH SERPL-MCNC: 6 IU/L — SIGNIFICANT CHANGE UP
LH SERPL-ACNC: 7.6 IU/L — SIGNIFICANT CHANGE UP
SPECIMEN SOURCE: SIGNIFICANT CHANGE UP

## 2024-08-26 PROCEDURE — 99233 SBSQ HOSP IP/OBS HIGH 50: CPT

## 2024-08-26 PROCEDURE — 99232 SBSQ HOSP IP/OBS MODERATE 35: CPT

## 2024-08-26 RX ORDER — BUTALBITAL, ACETAMINOPHEN AND CAFFEINE 50; 325; 40 MG/1; MG/1; MG/1
1 TABLET ORAL EVERY 6 HOURS
Refills: 0 | Status: DISCONTINUED | OUTPATIENT
Start: 2024-08-26 | End: 2024-08-26

## 2024-08-26 RX ORDER — BUTALBITAL, ACETAMINOPHEN AND CAFFEINE 50; 325; 40 MG/1; MG/1; MG/1
1 TABLET ORAL EVERY 6 HOURS
Refills: 0 | Status: DISCONTINUED | OUTPATIENT
Start: 2024-08-26 | End: 2024-09-01

## 2024-08-26 RX ORDER — ACETAMINOPHEN 500 MG/5ML
1000 LIQUID (ML) ORAL ONCE
Refills: 0 | Status: COMPLETED | OUTPATIENT
Start: 2024-08-26 | End: 2024-08-26

## 2024-08-26 RX ORDER — METOCLOPRAMIDE HCL 10 MG
10 TABLET ORAL ONCE
Refills: 0 | Status: COMPLETED | OUTPATIENT
Start: 2024-08-26 | End: 2024-08-26

## 2024-08-26 RX ADMIN — Medication 104 MILLIGRAM(S): at 11:26

## 2024-08-26 RX ADMIN — Medication 400 MILLIGRAM(S): at 11:27

## 2024-08-26 RX ADMIN — Medication 500 MILLILITER(S): at 11:26

## 2024-08-26 RX ADMIN — Medication 600 MILLIGRAM(S): at 10:07

## 2024-08-26 RX ADMIN — BUTALBITAL, ACETAMINOPHEN AND CAFFEINE 1 TABLET(S): 50; 325; 40 TABLET ORAL at 16:22

## 2024-08-27 ENCOUNTER — APPOINTMENT (OUTPATIENT)
Dept: OTOLARYNGOLOGY | Facility: CLINIC | Age: 24
End: 2024-08-27
Payer: MEDICAID

## 2024-08-27 VITALS
DIASTOLIC BLOOD PRESSURE: 81 MMHG | HEART RATE: 120 BPM | WEIGHT: 145 LBS | SYSTOLIC BLOOD PRESSURE: 127 MMHG | BODY MASS INDEX: 23.03 KG/M2 | HEIGHT: 66.5 IN

## 2024-08-27 VITALS
SYSTOLIC BLOOD PRESSURE: 102 MMHG | RESPIRATION RATE: 18 BRPM | HEART RATE: 100 BPM | OXYGEN SATURATION: 99 % | DIASTOLIC BLOOD PRESSURE: 67 MMHG | TEMPERATURE: 98 F

## 2024-08-27 DIAGNOSIS — D35.2 BENIGN NEOPLASM OF PITUITARY GLAND: ICD-10-CM

## 2024-08-27 DIAGNOSIS — E24.0 PITUITARY-DEPENDENT CUSHING'S DISEASE: ICD-10-CM

## 2024-08-27 LAB
OSMOLALITY SERPL: 294 MOSMOL/KG — SIGNIFICANT CHANGE UP (ref 275–300)
OSMOLALITY UR: 328 MOSM/KG — SIGNIFICANT CHANGE UP (ref 300–1000)
SODIUM UR-SCNC: 63 MMOL/L — SIGNIFICANT CHANGE UP
T3 SERPL-MCNC: 127 NG/DL — SIGNIFICANT CHANGE UP (ref 80–200)
T4 FREE SERPL-MCNC: 1.8 NG/DL — SIGNIFICANT CHANGE UP (ref 0.9–1.8)

## 2024-08-27 PROCEDURE — 96375 TX/PRO/DX INJ NEW DRUG ADDON: CPT

## 2024-08-27 PROCEDURE — 80053 COMPREHEN METABOLIC PANEL: CPT

## 2024-08-27 PROCEDURE — 85027 COMPLETE CBC AUTOMATED: CPT

## 2024-08-27 PROCEDURE — 83001 ASSAY OF GONADOTROPIN (FSH): CPT

## 2024-08-27 PROCEDURE — 81001 URINALYSIS AUTO W/SCOPE: CPT

## 2024-08-27 PROCEDURE — 83935 ASSAY OF URINE OSMOLALITY: CPT

## 2024-08-27 PROCEDURE — 82670 ASSAY OF TOTAL ESTRADIOL: CPT

## 2024-08-27 PROCEDURE — 84443 ASSAY THYROID STIM HORMONE: CPT

## 2024-08-27 PROCEDURE — 99239 HOSP IP/OBS DSCHRG MGMT >30: CPT

## 2024-08-27 PROCEDURE — 93005 ELECTROCARDIOGRAM TRACING: CPT

## 2024-08-27 PROCEDURE — 96374 THER/PROPH/DIAG INJ IV PUSH: CPT

## 2024-08-27 PROCEDURE — 84449 ASSAY OF TRANSCORTIN: CPT

## 2024-08-27 PROCEDURE — 82533 TOTAL CORTISOL: CPT

## 2024-08-27 PROCEDURE — 36415 COLL VENOUS BLD VENIPUNCTURE: CPT

## 2024-08-27 PROCEDURE — 84480 ASSAY TRIIODOTHYRONINE (T3): CPT

## 2024-08-27 PROCEDURE — 87086 URINE CULTURE/COLONY COUNT: CPT

## 2024-08-27 PROCEDURE — 84300 ASSAY OF URINE SODIUM: CPT

## 2024-08-27 PROCEDURE — 96376 TX/PRO/DX INJ SAME DRUG ADON: CPT

## 2024-08-27 PROCEDURE — 82962 GLUCOSE BLOOD TEST: CPT

## 2024-08-27 PROCEDURE — 99284 EMERGENCY DEPT VISIT MOD MDM: CPT | Mod: 25

## 2024-08-27 PROCEDURE — 84146 ASSAY OF PROLACTIN: CPT

## 2024-08-27 PROCEDURE — 31231 NASAL ENDOSCOPY DX: CPT | Mod: 52

## 2024-08-27 PROCEDURE — 83002 ASSAY OF GONADOTROPIN (LH): CPT

## 2024-08-27 PROCEDURE — G0378: CPT

## 2024-08-27 PROCEDURE — 99232 SBSQ HOSP IP/OBS MODERATE 35: CPT

## 2024-08-27 PROCEDURE — 82024 ASSAY OF ACTH: CPT

## 2024-08-27 PROCEDURE — 84439 ASSAY OF FREE THYROXINE: CPT

## 2024-08-27 PROCEDURE — 83930 ASSAY OF BLOOD OSMOLALITY: CPT

## 2024-08-27 PROCEDURE — 82550 ASSAY OF CK (CPK): CPT

## 2024-08-27 PROCEDURE — 99213 OFFICE O/P EST LOW 20 MIN: CPT | Mod: 25

## 2024-08-27 PROCEDURE — 84702 CHORIONIC GONADOTROPIN TEST: CPT

## 2024-08-27 RX ORDER — BUTALBITAL, ACETAMINOPHEN AND CAFFEINE 50; 325; 40 MG/1; MG/1; MG/1
1 TABLET ORAL
Qty: 12 | Refills: 0
Start: 2024-08-27 | End: 2024-08-30

## 2024-08-27 RX ORDER — ONDANSETRON 4 MG/1
4 TABLET, ORALLY DISINTEGRATING ORAL
Qty: 10 | Refills: 0 | Status: COMPLETED | COMMUNITY
Start: 2024-08-23 | End: 2024-08-27

## 2024-08-27 RX ORDER — ATOGEPANT 60 MG/1
60 TABLET ORAL
Qty: 30 | Refills: 3 | Status: COMPLETED | COMMUNITY
Start: 2024-08-22 | End: 2024-08-27

## 2024-08-27 RX ORDER — RIZATRIPTAN BENZOATE 10 MG/1
10 TABLET, ORALLY DISINTEGRATING ORAL
Qty: 9 | Refills: 5 | Status: COMPLETED | COMMUNITY
Start: 2024-08-23 | End: 2024-08-27

## 2024-08-27 RX ORDER — UBROGEPANT 100 MG/1
100 TABLET ORAL
Qty: 20 | Refills: 3 | Status: COMPLETED | COMMUNITY
Start: 2024-08-22 | End: 2024-08-27

## 2024-08-27 RX ORDER — METHYLPREDNISOLONE 4 MG/1
4 TABLET ORAL
Qty: 1 | Refills: 1 | Status: COMPLETED | COMMUNITY
Start: 2024-08-23 | End: 2024-08-27

## 2024-08-27 RX ADMIN — Medication 40 MILLIGRAM(S): at 10:53

## 2024-08-27 RX ADMIN — BUTALBITAL, ACETAMINOPHEN AND CAFFEINE 1 TABLET(S): 50; 325; 40 TABLET ORAL at 10:53

## 2024-08-27 NOTE — PLAN
[TextEntry] : Continue saline irrigations.  Cont close fu with endo and nsgy.  Follow up in 1 month.

## 2024-08-27 NOTE — HISTORY OF PRESENT ILLNESS
[de-identified] : Update 8/27/24: s/p TSRP of pituitary microadenoma with L FMG 8/2/24. Using saline irrigations daily. Overall feeling fatigued and tired, but otherwise ok. Pathology: Most consistent with normal anterior pituitary tissue (adenohypophysis)   23F presenting with PMH of Cushings found to have left sided pituitary microadenoma. This was confirmed with IPSS. Hx cosmetic rhinoplasty. Breathes well through nose.

## 2024-08-27 NOTE — PROCEDURE
[FreeTextEntry6] : Nasal Endoscopy: Procedure: Bilateral nasal endoscopy (CPT 32058)   Indication: Anterior rhinoscopy was inadequate to evaluate pathology.  Left: Septum midline Moderate inferior turbinate hypertrophy  crusting under IT on left from donor site, partially removed with suction and forceps  Right:  Septum with spur right Moderate inferior turbinate hypertrophy posterior septectomy with small crust, left in place minimal crusting in sphenoid left in place

## 2024-08-27 NOTE — ASSESSMENT
[FreeTextEntry1] : 23F presenting with Cushing disease, IPSS confirms left sided s/p TSH with L FMG 8/2/24.

## 2024-08-28 ENCOUNTER — APPOINTMENT (OUTPATIENT)
Dept: NEUROSURGERY | Facility: CLINIC | Age: 24
End: 2024-08-28

## 2024-08-28 VITALS
HEIGHT: 66.5 IN | HEART RATE: 86 BPM | BODY MASS INDEX: 23.03 KG/M2 | SYSTOLIC BLOOD PRESSURE: 118 MMHG | DIASTOLIC BLOOD PRESSURE: 79 MMHG | OXYGEN SATURATION: 100 % | WEIGHT: 145 LBS

## 2024-08-28 LAB
CORTICOSTEROID BIND GLOBULIN: 4.6 MG/DL
CORTIS SERPL-MCNC: 13 UG/DL
CORTISOL, FREE: 0.24 UG/DL
PFCX: 1.9 %

## 2024-08-28 PROCEDURE — 99024 POSTOP FOLLOW-UP VISIT: CPT

## 2024-08-28 NOTE — CONSULT LETTER
[Dear  ___] : Dear  [unfilled], [Courtesy Letter:] : I had the pleasure of seeing your patient, [unfilled], in my office today. [Sincerely,] : Sincerely, [FreeTextEntry2] : Sang Garcia  [address] [place] [address] [place] [address] [FreeTextEntry3] : Yannick Garsia MD, PhD, FRCPSC  Attending Neurosurgeon   of Neurosurgery  [facility]  [address], [address] [place] [address]  Office: [phone]  Fax: [phone]

## 2024-09-04 ENCOUNTER — NON-APPOINTMENT (OUTPATIENT)
Age: 24
End: 2024-09-04

## 2024-09-05 LAB
CORTICOSTEROID BINDING GLOBULIN RESULT: 3.4 MG/DL — HIGH
CORTICOSTEROID BINDING GLOBULIN RESULT: 3.7 MG/DL — HIGH
CORTIS F/TOTAL MFR SERPL: 4.3 % — SIGNIFICANT CHANGE UP
CORTIS SERPL-MCNC: 16 UG/DL — SIGNIFICANT CHANGE UP
CORTISOL, FREE RESULT: 0.69 UG/DL — SIGNIFICANT CHANGE UP

## 2024-09-07 ENCOUNTER — NON-APPOINTMENT (OUTPATIENT)
Age: 24
End: 2024-09-07

## 2024-09-09 LAB
ESTRADIOL FREE MFR SERPL: 1.8 % — SIGNIFICANT CHANGE UP
ESTRADIOL FREE SERPL-MCNC: 0.52 PG/ML — LOW
ESTRADIOL SERPL HS-MCNC: 29 PG/ML — SIGNIFICANT CHANGE UP

## 2024-09-10 ENCOUNTER — APPOINTMENT (OUTPATIENT)
Dept: OTOLARYNGOLOGY | Facility: CLINIC | Age: 24
End: 2024-09-10
Payer: MEDICAID

## 2024-09-10 VITALS
HEIGHT: 66.5 IN | SYSTOLIC BLOOD PRESSURE: 128 MMHG | BODY MASS INDEX: 22.23 KG/M2 | HEART RATE: 102 BPM | DIASTOLIC BLOOD PRESSURE: 80 MMHG | WEIGHT: 140 LBS

## 2024-09-10 DIAGNOSIS — H69.93 UNSPECIFIED EUSTACHIAN TUBE DISORDER, BILATERAL: ICD-10-CM

## 2024-09-10 PROCEDURE — 31231 NASAL ENDOSCOPY DX: CPT

## 2024-09-10 PROCEDURE — 99213 OFFICE O/P EST LOW 20 MIN: CPT | Mod: 25

## 2024-09-10 RX ORDER — FLUTICASONE PROPIONATE 50 UG/1
50 SPRAY, METERED NASAL
Qty: 1 | Refills: 5 | Status: ACTIVE | COMMUNITY
Start: 2024-09-10 | End: 1900-01-01

## 2024-09-10 RX ORDER — IBUPROFEN 800 MG
TABLET ORAL
Refills: 0 | Status: ACTIVE | COMMUNITY

## 2024-09-10 NOTE — PLAN
[TextEntry] : Start flonase BID for the ETD, she can continue to hold off on saline irrigations.  Follow up in 3-4 months.

## 2024-09-10 NOTE — HISTORY OF PRESENT ILLNESS
[de-identified] : Update 9/10/24: s/p TSRP of pituitary microadenoma with L FMG 8/2/24. Using saline irrigations occasionally but finds them to be uncomfortable. She reports she has feeling that she is underwater in both ears and causing noise sensitivity. She went to urgent care and was told needed decongestant.  Update 8/27/24: s/p TSRP of pituitary microadenoma with L FMG 8/2/24. Using saline irrigations daily. Overall feeling fatigued and tired, but otherwise ok. Pathology: Most consistent with normal anterior pituitary tissue (adenohypophysis)   23F presenting with PMH of Cushings found to have left sided pituitary microadenoma. This was confirmed with IPSS. Hx cosmetic rhinoplasty. Breathes well through nose.

## 2024-09-10 NOTE — PROCEDURE
[FreeTextEntry6] : Nasal Endoscopy: Procedure: Bilateral nasal endoscopy (CPT 75147)   Indication: Anterior rhinoscopy was inadequate to evaluate pathology.  Left: Septum midline Moderate inferior turbinate hypertrophy  L donor site well healed Et orifice clear  posterior septectomy clear  Right:  Septum midline Moderate inferior turbinate hypertrophy posterior septectomy clear sphenoid defect well healed granulation along skull base

## 2024-09-11 ENCOUNTER — APPOINTMENT (OUTPATIENT)
Dept: ENDOCRINOLOGY | Facility: CLINIC | Age: 24
End: 2024-09-11
Payer: MEDICAID

## 2024-09-11 VITALS
DIASTOLIC BLOOD PRESSURE: 80 MMHG | HEIGHT: 66.5 IN | SYSTOLIC BLOOD PRESSURE: 120 MMHG | OXYGEN SATURATION: 98 % | WEIGHT: 144 LBS | BODY MASS INDEX: 22.87 KG/M2 | HEART RATE: 92 BPM

## 2024-09-11 DIAGNOSIS — D35.2 BENIGN NEOPLASM OF PITUITARY GLAND: ICD-10-CM

## 2024-09-11 DIAGNOSIS — E87.1 HYPO-OSMOLALITY AND HYPONATREMIA: ICD-10-CM

## 2024-09-11 PROCEDURE — 99214 OFFICE O/P EST MOD 30 MIN: CPT

## 2024-09-11 PROCEDURE — G2211 COMPLEX E/M VISIT ADD ON: CPT | Mod: NC

## 2024-09-11 NOTE — ASSESSMENT
[FreeTextEntry1] : Pituitary macroadenoma s/p resection - Pathology shows pituitary adenoma, stains ruled out Cushing disease  - Normal free cortisol, elevated CBG while on OCP, now off OCP, repeat labs will be causios with interpretting since has been only a week since stopped the ocp  - Hemodynamically stable, currently off steroids. steroids not indicated at this time - Free T4 normal Went to the hospital but all labs were normal   Na stable  No history of diabetes Dexcom not concerning   Dysfunction of both eustachian tubes Headaches  On Flonase  Try to not do pituitary labs on Flonase, needs to stop a week prior    I spent 30 minutes discussing with patient face to face and non-face to face reviewing documentations, labs, and/or imaging, also discussing the management plans.  RTC in 3 months for 30 min

## 2024-09-11 NOTE — HISTORY OF PRESENT ILLNESS
[FreeTextEntry1] : 23F here for follow up of pituitary adenoma S/p resection    Case  PMHx anxiety, questionable Cushing's syndrome s/p endonasal transsphenoidal resection of pituitary microadenoma. Presentation was complected by SIADH as part of triphasic response.   Patient has undergone endonasal transsphenoidal resection, on 8/2 and came back on 8/9, patient did not require hormone replacement after surgery on discharge. Patient came back on the 8/9 with , that dropped down to 115. was optimized in the hospital and NA normalized.   Before the surgery she has undergone IPSS for questionable Cushing disease, however I was unable to know exactly, the initial work up and was not able to determine if the test was with desmopressin  Off birth control, stopped on 8/11  This visit: Went to the ER took Dex for severe head pressure because they feared to use NASAIDs, she went to her migraine specialist who gave her methylprednisolone and to be tapered down  8/2024 CMP  which is acceptable CBC normal no anemia  Vit B12, folate and D normal Free cortisol normal, CBG high due to birth control use Cortisol, FSH 10.5, LH 49, PRL, SHBG, CMP, TSH and FT4 all normal ACTH canceled, no need to repeat

## 2024-09-16 ENCOUNTER — OFFICE (OUTPATIENT)
Dept: URBAN - METROPOLITAN AREA CLINIC 8 | Facility: CLINIC | Age: 24
Setting detail: OPHTHALMOLOGY
End: 2024-09-16
Payer: MEDICAID

## 2024-09-16 DIAGNOSIS — H52.13: ICD-10-CM

## 2024-09-16 DIAGNOSIS — D35.2: ICD-10-CM

## 2024-09-16 DIAGNOSIS — H16.223: ICD-10-CM

## 2024-09-16 PROCEDURE — 92015 DETERMINE REFRACTIVE STATE: CPT | Performed by: OPHTHALMOLOGY

## 2024-09-16 PROCEDURE — 99213 OFFICE O/P EST LOW 20 MIN: CPT | Performed by: OPHTHALMOLOGY

## 2024-09-16 ASSESSMENT — CONFRONTATIONAL VISUAL FIELD TEST (CVF)
OD_FINDINGS: FULL
OS_FINDINGS: FULL

## 2024-09-25 ENCOUNTER — APPOINTMENT (OUTPATIENT)
Dept: CARDIOLOGY | Facility: CLINIC | Age: 24
End: 2024-09-25
Payer: MEDICAID

## 2024-09-25 VITALS
SYSTOLIC BLOOD PRESSURE: 106 MMHG | HEART RATE: 97 BPM | BODY MASS INDEX: 22.23 KG/M2 | WEIGHT: 140 LBS | OXYGEN SATURATION: 99 % | HEIGHT: 66.5 IN | DIASTOLIC BLOOD PRESSURE: 52 MMHG

## 2024-09-25 DIAGNOSIS — H69.93 UNSPECIFIED EUSTACHIAN TUBE DISORDER, BILATERAL: ICD-10-CM

## 2024-09-25 DIAGNOSIS — R06.09 OTHER FORMS OF DYSPNEA: ICD-10-CM

## 2024-09-25 DIAGNOSIS — D35.2 BENIGN NEOPLASM OF PITUITARY GLAND: ICD-10-CM

## 2024-09-25 DIAGNOSIS — G43.011 MIGRAINE W/OUT AURA, INTRACTABLE, WITH STATUS MIGRAINOSUS: ICD-10-CM

## 2024-09-25 PROCEDURE — G2211 COMPLEX E/M VISIT ADD ON: CPT | Mod: NC

## 2024-09-25 PROCEDURE — 99214 OFFICE O/P EST MOD 30 MIN: CPT

## 2024-09-25 NOTE — DISCUSSION/SUMMARY
[FreeTextEntry1] : KAELA ZENDEJAS  is a 23 year F  who presents today  with the above history and the following active issues.   Seen in immediate office visit for borderline asymptomatic hypotension after pituitary micro-adenoma surgery, hyponatremia multiple hospitalizations [facility] for sodium infusions.  Sodium has stabilized in the 140s. Officer BP today 106/52 HR 97. No dizziness, syncope, fatigue.  Patient will follow-up with cardiologist who normally follows patient Dr. Aniket Botello in 3 months.
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
## 2024-09-25 NOTE — CARDIOLOGY SUMMARY
[___] : [unfilled] [LVEF ___%] : LVEF [unfilled]% [de-identified] : February 15, 2022.  Normal sinus rhythm left atrial abnormality.  Nonspecific T wave changes. May 3, 2023 normal sinus rhythm October 14, 2023.  Normal sinus rhythm normal intervals.  Outside EKG. [de-identified] : Event monitor.  November 27, 2023.  Normal sinus rhythm.  Average 85.  Rare ectopic beats.   [facility] 7/15/2022 SR 51-164bpm, rare ectopy, PVC burden <1% Event monitor.  2020.  2 weeks.  Premature atrial ventricular beats.  Second-degree AV block.  Mobitz 1.  Junctional escape beats. [de-identified] : 2019.  Normal exercise stress test. [de-identified] : May 22, 2020 EF 60% mild mitral regurgitation.  Normal left atrium. November 27, 2023.  LVEF 60%.

## 2024-09-25 NOTE — DISCUSSION/SUMMARY
[FreeTextEntry1] : KAELA ZENDEJAS  is a 23 year F  who presents today  with the above history and the following active issues.   Seen in immediate office visit for borderline asymptomatic hypotension after pituitary micro-adenoma surgery, hyponatremia multiple hospitalizations [facility] for sodium infusions.  Sodium has stabilized in the 140s. Officer BP today 106/52 HR 97. No dizziness, syncope, fatigue.  Patient will follow-up with cardiologist who normally follows patient Dr. Aniket Botello in 3 months.

## 2024-09-25 NOTE — CARDIOLOGY SUMMARY
[___] : [unfilled] [LVEF ___%] : LVEF [unfilled]% [de-identified] : February 15, 2022.  Normal sinus rhythm left atrial abnormality.  Nonspecific T wave changes. May 3, 2023 normal sinus rhythm October 14, 2023.  Normal sinus rhythm normal intervals.  Outside EKG. [de-identified] : Event monitor.  November 27, 2023.  Normal sinus rhythm.  Average 85.  Rare ectopic beats.   [facility] 7/15/2022 SR 51-164bpm, rare ectopy, PVC burden <1% Event monitor.  2020.  2 weeks.  Premature atrial ventricular beats.  Second-degree AV block.  Mobitz 1.  Junctional escape beats. [de-identified] : 2019.  Normal exercise stress test. [de-identified] : May 22, 2020 EF 60% mild mitral regurgitation.  Normal left atrium. November 27, 2023.  LVEF 60%.

## 2024-09-26 ENCOUNTER — APPOINTMENT (OUTPATIENT)
Dept: NEUROLOGY | Facility: CLINIC | Age: 24
End: 2024-09-26
Payer: MEDICAID

## 2024-09-26 VITALS
DIASTOLIC BLOOD PRESSURE: 74 MMHG | SYSTOLIC BLOOD PRESSURE: 120 MMHG | BODY MASS INDEX: 22.23 KG/M2 | WEIGHT: 140 LBS | HEIGHT: 66.5 IN | HEART RATE: 71 BPM | OXYGEN SATURATION: 99 %

## 2024-09-26 DIAGNOSIS — E24.0 PITUITARY-DEPENDENT CUSHING'S DISEASE: ICD-10-CM

## 2024-09-26 PROBLEM — G43.919 INTRACTABLE MIGRAINE WITHOUT STATUS MIGRAINOSUS, UNSPECIFIED MIGRAINE TYPE: Status: ACTIVE | Noted: 2024-09-26

## 2024-09-26 PROCEDURE — 99215 OFFICE O/P EST HI 40 MIN: CPT

## 2024-09-26 NOTE — HISTORY OF PRESENT ILLNESS
[FreeTextEntry1] : Ms. 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 She is now status post Transsphenoidal pituitary microadenoma resection on 8/2/2024, complicated by hyponatremia, discharged on 8/15/2024. Post discharge, she developed recurrence of daily headaches and took nabumetone, however this did not help with her symptoms. She  saw ENT on 9/13/24 and was started on nasal spray which has helped her symptoms. She began seeing Dr. Newell for headache management and will be seeing her again in a few days. She reports head tightness with looking at blue lights and phones. She has no other new, concerning neurological symptoms.

## 2024-09-26 NOTE — DISCUSSION/SUMMARY
[FreeTextEntry1] : Ms. 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 No focal neurological deficits on exam. She will follow-up with Dr. Rivera, as needed for further management. She will follow-up with me as needed. All of the patient's questions and concerns were addressed.

## 2024-09-26 NOTE — CONSULT LETTER
[Dear  ___] : Dear  [unfilled], [Consult Letter:] : I had the pleasure of evaluating your patient, [unfilled]. [Please see my note below.] : Please see my note below. [Consult Closing:] : Thank you very much for allowing me to participate in the care of this patient.  If you have any questions, please do not hesitate to contact me. [Sincerely,] : Sincerely, [FreeTextEntry3] : John Casanova MD Chief, Vascular Neurology and Neurology Service , NeuroEndovascular Surgery Professor of Neurology and Radiology [facility] [employer] at [facility] Director, NeuroEndovascular Surgery [facility]

## 2024-10-02 ENCOUNTER — APPOINTMENT (OUTPATIENT)
Dept: CARDIOLOGY | Facility: CLINIC | Age: 24
End: 2024-10-02
Payer: MEDICAID

## 2024-10-02 VITALS
HEART RATE: 84 BPM | OXYGEN SATURATION: 99 % | WEIGHT: 147 LBS | SYSTOLIC BLOOD PRESSURE: 114 MMHG | DIASTOLIC BLOOD PRESSURE: 58 MMHG | BODY MASS INDEX: 23.37 KG/M2

## 2024-10-02 DIAGNOSIS — I49.1 ATRIAL PREMATURE DEPOLARIZATION: ICD-10-CM

## 2024-10-02 DIAGNOSIS — R00.2 PALPITATIONS: ICD-10-CM

## 2024-10-02 DIAGNOSIS — I49.49 OTHER PREMATURE DEPOLARIZATION: ICD-10-CM

## 2024-10-02 DIAGNOSIS — E87.1 HYPO-OSMOLALITY AND HYPONATREMIA: ICD-10-CM

## 2024-10-02 PROCEDURE — 99213 OFFICE O/P EST LOW 20 MIN: CPT

## 2024-10-02 NOTE — DISCUSSION/SUMMARY
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 Sodium has stabilized in the 140s.  Presents today to see if her BP cuff is accurate. Measurements as noted above. Patient feels well and has no complaints.  Will recheck AM cortisol level.  F/u in 1 year or PRN.  Ongoing f/u with PCP.  F/U: as above. Discussed red flag symptoms, which would warrant sooner or emergent medical evaluation. Any questions and concerns were addressed and resolved.  Sincerely, Desirae Cho [facility] Patient's history, testing, and plan was reviewed with supervising physician, Dr. Aniket Botello
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
## 2024-10-04 ENCOUNTER — APPOINTMENT (OUTPATIENT)
Dept: NEUROLOGY | Facility: CLINIC | Age: 24
End: 2024-10-04
Payer: MEDICAID

## 2024-10-04 VITALS
HEART RATE: 73 BPM | DIASTOLIC BLOOD PRESSURE: 72 MMHG | HEIGHT: 66.5 IN | WEIGHT: 148 LBS | SYSTOLIC BLOOD PRESSURE: 117 MMHG | OXYGEN SATURATION: 99 % | BODY MASS INDEX: 23.5 KG/M2

## 2024-10-04 DIAGNOSIS — G43.919 MIGRAINE, UNSPECIFIED, INTRACTABLE, W/OUT STATUS MIGRAINOSUS: ICD-10-CM

## 2024-10-04 PROCEDURE — 99213 OFFICE O/P EST LOW 20 MIN: CPT

## 2024-10-06 NOTE — ASSESSMENT
[FreeTextEntry1] : 24 y/o woman with headache and mainly sinus pressure follow cerebral angiogram in May for pituitary adenoma. Worse since resection 2 weeks ago. She has had headache the past 8 days. Likely headache attributed to cerebral angiogram or pituitary apoplexy. She has been headache free in the last two months. Currently off preventatives.      Imaging not indicated at this time. If there is a change in headache features or patient's condition then will re-consider imaging.   Acute: Ubrelvy 50-100mg as needed.   Prevention: none for now   Advised to keep headache journal to track headache frequency, triggers, and response to treatment.   Discussed common side effects of prescribed medications and potential alternatives.    Counseled patient on the importance of maintaining a healthy lifestyle, including balanced diet, adequate hydration, stress management, proper sleep hygiene, and physical activity.     Answered all questions and concerns to the best of my ability. Advised to call for any new or worsening symptoms.    In order to maintain continuity of care/prescription refills, patients must be seen on a yearly basis.   Total time spent on the day of the visit, including pre-visit and post-visit time was 20 minutes.   Follow up in 6 months or sooner if needed.

## 2024-10-06 NOTE — ASSESSMENT
[FreeTextEntry1] : 22 y/o woman with headache and mainly sinus pressure follow cerebral angiogram in May for pituitary adenoma. Worse since resection 2 weeks ago. She has had headache the past 8 days. Likely headache attributed to cerebral angiogram or pituitary apoplexy. She has been headache free in the last two months. Currently off preventatives.      Imaging not indicated at this time. If there is a change in headache features or patient's condition then will re-consider imaging.   Acute: Ubrelvy 50-100mg as needed.   Prevention: none for now   Advised to keep headache journal to track headache frequency, triggers, and response to treatment.   Discussed common side effects of prescribed medications and potential alternatives.    Counseled patient on the importance of maintaining a healthy lifestyle, including balanced diet, adequate hydration, stress management, proper sleep hygiene, and physical activity.     Answered all questions and concerns to the best of my ability. Advised to call for any new or worsening symptoms.    In order to maintain continuity of care/prescription refills, patients must be seen on a yearly basis.   Total time spent on the day of the visit, including pre-visit and post-visit time was 20 minutes.   Follow up in 6 months or sooner if needed.    04-Dec-2018

## 2024-10-06 NOTE — HISTORY OF PRESENT ILLNESS
[FreeTextEntry1] : Since last visit:  Last headache was about 2 weeks after her last office visit. Looking at screens  she finds of tightness. She has blue light filter on her phone and glasses. She had a recent eye checkup. Ubrelvy work very well. She stopped the Qulipta because of nausea. No new medical problems or medications.   Headache currently treated with: Ubrelvy 50mg as needed.   HPI: 22 y/o woman with hx of pituitary adenoma. She had cerebral agio May 22nd , started having headache right after the procedure. She saw Dr. Casanova and did 5 day course of nabumetone.   No hx of headaches prior to the procedure. She went to the ER 2 days ago. She had Tylenol IV, reglan, dexamethasone, and sumatriptan. She is not allowed to take NSAIDS yet after her surgery. Her pituitary adenoma was surgical resection Aug 02. She hasn't been a belt to sleep because of the pain and pressure which has been making things worse.  HAs began: May Location: sinuses, whole head, can radiate to the neck Quality: pressure, throbbing, piercing pain Severity: 11/10 Disability: Duration: hours  Frequency:18 days in past month,  Temporal course: Time of day: Pain Free between Attacks: Triggers: poor sleep, bright lights,  loud noise Relieving factors: Exacerbating factors: exercise and Valsalva maneuver make headache worse Prodrome or postdrome:   Aura: none   Associated with menses: possible   Ass'd Symptoms: anorexia Nausea + Vomiting- Photophobia + Phonophobia:+ Osmophobia  brain fog/difficulty concentrating + Irritability + Allodynia - Blurred Vision Neck pain Dizziness   Autonomic features: Eye tearing Nose running Eye redness Foreign body sensation   Additional Social/Work History: Occupation: out of work due to surgery Habits: Caffeine: none ETOH:   none     Tobacco:  none     Drugs: none Exercise: not recently  Diet: eats breakfast, not as good recently  hydration: 1200ml a day Ophthalmologic: Sleep: poor  Dental: chattering teeth, clenching Sinus/Allergies: none Head Trauma: cerebral angio Hypermobility: Psychiatric:   Sex Active/Pregnancy planning: none                      contraception: was recently taken off birth control  Menstruation: irregular                                      age of menarche:                                 age of menopause:    Treatment present: Acute: ubrelvy, qulipta Preventive: none   Prior medications: sumatriptan 100mg , rizatriptan 10mg, Qulipta- caused nausea   Non-pharmacologic Tx:   Imaging: CTH unremarkable     Childhood precursors:  none  FH: father with bad migraines

## 2024-10-06 NOTE — HISTORY OF PRESENT ILLNESS
[FreeTextEntry1] : Since last visit:  Last headache was about 2 weeks after her last office visit. Looking at screens  she finds of tightness. She has blue light filter on her phone and glasses. She had a recent eye checkup. Ubrelvy work very well. She stopped the Qulipta because of nausea. No new medical problems or medications.   Headache currently treated with: Ubrelvy 50mg as needed.   HPI: 22 y/o woman with hx of pituitary adenoma. She had cerebral agio May 22nd , started having headache right after the procedure. She saw Dr. Csaanova and did 5 day course of nabumetone.   No hx of headaches prior to the procedure. She went to the ER 2 days ago. She had Tylenol IV, reglan, dexamethasone, and sumatriptan. She is not allowed to take NSAIDS yet after her surgery. Her pituitary adenoma was surgical resection Aug 02. She hasn't been a belt to sleep because of the pain and pressure which has been making things worse.  HAs began: May Location: sinuses, whole head, can radiate to the neck Quality: pressure, throbbing, piercing pain Severity: 11/10 Disability: Duration: hours  Frequency:18 days in past month,  Temporal course: Time of day: Pain Free between Attacks: Triggers: poor sleep, bright lights,  loud noise Relieving factors: Exacerbating factors: exercise and Valsalva maneuver make headache worse Prodrome or postdrome:   Aura: none   Associated with menses: possible   Ass'd Symptoms: anorexia Nausea + Vomiting- Photophobia + Phonophobia:+ Osmophobia  brain fog/difficulty concentrating + Irritability + Allodynia - Blurred Vision Neck pain Dizziness   Autonomic features: Eye tearing Nose running Eye redness Foreign body sensation   Additional Social/Work History: Occupation: out of work due to surgery Habits: Caffeine: none ETOH:   none     Tobacco:  none     Drugs: none Exercise: not recently  Diet: eats breakfast, not as good recently  hydration: 1200ml a day Ophthalmologic: Sleep: poor  Dental: chattering teeth, clenching Sinus/Allergies: none Head Trauma: cerebral angio Hypermobility: Psychiatric:   Sex Active/Pregnancy planning: none                      contraception: was recently taken off birth control  Menstruation: irregular                                      age of menarche:                                 age of menopause:    Treatment present: Acute: ubrelvy, qulipta Preventive: none   Prior medications: sumatriptan 100mg , rizatriptan 10mg, Qulipta- caused nausea   Non-pharmacologic Tx:   Imaging: CTH unremarkable     Childhood precursors:  none  FH: father with bad migraines

## 2024-10-21 ENCOUNTER — LABORATORY RESULT (OUTPATIENT)
Age: 24
End: 2024-10-21

## 2024-10-21 DIAGNOSIS — E87.1 HYPO-OSMOLALITY AND HYPONATREMIA: ICD-10-CM

## 2024-10-21 DIAGNOSIS — R00.2 PALPITATIONS: ICD-10-CM

## 2024-10-21 DIAGNOSIS — I49.49 OTHER PREMATURE DEPOLARIZATION: ICD-10-CM

## 2024-10-22 ENCOUNTER — APPOINTMENT (OUTPATIENT)
Dept: CARDIOLOGY | Facility: CLINIC | Age: 24
End: 2024-10-22
Payer: MEDICAID

## 2024-10-22 ENCOUNTER — LABORATORY RESULT (OUTPATIENT)
Age: 24
End: 2024-10-22

## 2024-10-22 VITALS
DIASTOLIC BLOOD PRESSURE: 58 MMHG | BODY MASS INDEX: 22.87 KG/M2 | WEIGHT: 144 LBS | HEART RATE: 85 BPM | OXYGEN SATURATION: 99 % | SYSTOLIC BLOOD PRESSURE: 114 MMHG | HEIGHT: 66.5 IN

## 2024-10-22 DIAGNOSIS — I95.9 HYPOTENSION, UNSPECIFIED: ICD-10-CM

## 2024-10-22 PROCEDURE — 93000 ELECTROCARDIOGRAM COMPLETE: CPT

## 2024-10-22 PROCEDURE — 99213 OFFICE O/P EST LOW 20 MIN: CPT

## 2024-11-04 ENCOUNTER — APPOINTMENT (OUTPATIENT)
Dept: NEUROSURGERY | Facility: CLINIC | Age: 24
End: 2024-11-04
Payer: MEDICAID

## 2024-11-04 VITALS
HEIGHT: 66.5 IN | OXYGEN SATURATION: 100 % | DIASTOLIC BLOOD PRESSURE: 74 MMHG | SYSTOLIC BLOOD PRESSURE: 121 MMHG | WEIGHT: 145 LBS | BODY MASS INDEX: 23.03 KG/M2 | HEART RATE: 77 BPM

## 2024-11-04 DIAGNOSIS — E89.3 POSTPROCEDURAL HYPOPITUITARISM: ICD-10-CM

## 2024-11-04 DIAGNOSIS — E24.0 PITUITARY-DEPENDENT CUSHING'S DISEASE: ICD-10-CM

## 2024-11-04 DIAGNOSIS — D35.2 BENIGN NEOPLASM OF PITUITARY GLAND: ICD-10-CM

## 2024-11-04 PROCEDURE — 99213 OFFICE O/P EST LOW 20 MIN: CPT

## 2024-11-26 ENCOUNTER — NON-APPOINTMENT (OUTPATIENT)
Age: 24
End: 2024-11-26

## 2024-12-02 ENCOUNTER — APPOINTMENT (OUTPATIENT)
Dept: ENDOCRINOLOGY | Facility: CLINIC | Age: 24
End: 2024-12-02
Payer: MEDICAID

## 2024-12-02 VITALS
SYSTOLIC BLOOD PRESSURE: 112 MMHG | WEIGHT: 140 LBS | HEART RATE: 100 BPM | HEIGHT: 66.5 IN | DIASTOLIC BLOOD PRESSURE: 72 MMHG | OXYGEN SATURATION: 99 % | BODY MASS INDEX: 22.23 KG/M2

## 2024-12-02 DIAGNOSIS — E89.3 POSTPROCEDURAL HYPOPITUITARISM: ICD-10-CM

## 2024-12-02 PROCEDURE — 99213 OFFICE O/P EST LOW 20 MIN: CPT

## 2024-12-02 PROCEDURE — G2211 COMPLEX E/M VISIT ADD ON: CPT | Mod: NC

## 2024-12-06 ENCOUNTER — APPOINTMENT (OUTPATIENT)
Dept: OTOLARYNGOLOGY | Facility: CLINIC | Age: 24
End: 2024-12-06
Payer: MEDICAID

## 2024-12-06 DIAGNOSIS — J30.9 ALLERGIC RHINITIS, UNSPECIFIED: ICD-10-CM

## 2024-12-06 DIAGNOSIS — D35.2 BENIGN NEOPLASM OF PITUITARY GLAND: ICD-10-CM

## 2024-12-06 DIAGNOSIS — H69.93 UNSPECIFIED EUSTACHIAN TUBE DISORDER, BILATERAL: ICD-10-CM

## 2024-12-06 PROCEDURE — 31231 NASAL ENDOSCOPY DX: CPT

## 2024-12-06 PROCEDURE — 99213 OFFICE O/P EST LOW 20 MIN: CPT | Mod: 25

## 2024-12-06 RX ORDER — AZELASTINE HYDROCHLORIDE 137 UG/1
137 SPRAY, METERED NASAL TWICE DAILY
Qty: 3 | Refills: 2 | Status: ACTIVE | COMMUNITY
Start: 2024-12-06 | End: 1900-01-01

## 2024-12-06 RX ORDER — MECLIZINE HYDROCHLORIDE 25 MG/1
25 TABLET ORAL
Qty: 45 | Refills: 0 | Status: COMPLETED | COMMUNITY
Start: 2024-08-15

## 2024-12-06 RX ORDER — BUTALBITAL, ACETAMINOPHEN AND CAFFEINE 325; 50; 40 MG/1; MG/1; MG/1
50-325-40 TABLET ORAL
Qty: 12 | Refills: 0 | Status: COMPLETED | COMMUNITY
Start: 2024-08-27

## 2024-12-10 ENCOUNTER — APPOINTMENT (OUTPATIENT)
Dept: CARDIOLOGY | Facility: CLINIC | Age: 24
End: 2024-12-10

## 2024-12-11 ENCOUNTER — APPOINTMENT (OUTPATIENT)
Dept: NEUROSURGERY | Facility: CLINIC | Age: 24
End: 2024-12-11

## 2024-12-20 ENCOUNTER — APPOINTMENT (OUTPATIENT)
Dept: NEUROSURGERY | Facility: CLINIC | Age: 24
End: 2024-12-20

## 2025-01-07 ENCOUNTER — APPOINTMENT (OUTPATIENT)
Dept: MRI IMAGING | Facility: CLINIC | Age: 25
End: 2025-01-07
Payer: MEDICAID

## 2025-01-07 ENCOUNTER — OUTPATIENT (OUTPATIENT)
Dept: OUTPATIENT SERVICES | Facility: HOSPITAL | Age: 25
LOS: 1 days | End: 2025-01-07
Payer: COMMERCIAL

## 2025-01-07 DIAGNOSIS — E89.3 POSTPROCEDURAL HYPOPITUITARISM: ICD-10-CM

## 2025-01-07 DIAGNOSIS — Z98.890 OTHER SPECIFIED POSTPROCEDURAL STATES: Chronic | ICD-10-CM

## 2025-01-07 DIAGNOSIS — D35.2 BENIGN NEOPLASM OF PITUITARY GLAND: ICD-10-CM

## 2025-01-07 DIAGNOSIS — E24.0 PITUITARY-DEPENDENT CUSHING'S DISEASE: ICD-10-CM

## 2025-01-07 PROCEDURE — 70553 MRI BRAIN STEM W/O & W/DYE: CPT | Mod: 26

## 2025-01-07 PROCEDURE — A9585: CPT

## 2025-01-07 PROCEDURE — 70553 MRI BRAIN STEM W/O & W/DYE: CPT

## 2025-01-21 ENCOUNTER — NON-APPOINTMENT (OUTPATIENT)
Age: 25
End: 2025-01-21

## 2025-01-21 ENCOUNTER — APPOINTMENT (OUTPATIENT)
Dept: CARDIOLOGY | Facility: CLINIC | Age: 25
End: 2025-01-21
Payer: MEDICAID

## 2025-01-21 VITALS
HEIGHT: 66.5 IN | HEART RATE: 81 BPM | WEIGHT: 136 LBS | DIASTOLIC BLOOD PRESSURE: 58 MMHG | SYSTOLIC BLOOD PRESSURE: 112 MMHG | OXYGEN SATURATION: 99 % | BODY MASS INDEX: 21.6 KG/M2

## 2025-01-21 DIAGNOSIS — D35.2 BENIGN NEOPLASM OF PITUITARY GLAND: ICD-10-CM

## 2025-01-21 DIAGNOSIS — R00.2 PALPITATIONS: ICD-10-CM

## 2025-01-21 DIAGNOSIS — E89.3 POSTPROCEDURAL HYPOPITUITARISM: ICD-10-CM

## 2025-01-21 DIAGNOSIS — I49.49 OTHER PREMATURE DEPOLARIZATION: ICD-10-CM

## 2025-01-21 PROCEDURE — 99214 OFFICE O/P EST MOD 30 MIN: CPT

## 2025-01-21 RX ORDER — LORATADINE 10 MG
10 CAPSULE ORAL
Refills: 0 | Status: ACTIVE | COMMUNITY

## 2025-02-04 ENCOUNTER — APPOINTMENT (OUTPATIENT)
Dept: NEUROSURGERY | Facility: CLINIC | Age: 25
End: 2025-02-04
Payer: MEDICAID

## 2025-02-04 VITALS
OXYGEN SATURATION: 99 % | WEIGHT: 137 LBS | DIASTOLIC BLOOD PRESSURE: 82 MMHG | SYSTOLIC BLOOD PRESSURE: 129 MMHG | HEIGHT: 66.5 IN | HEART RATE: 79 BPM | BODY MASS INDEX: 21.76 KG/M2

## 2025-02-04 DIAGNOSIS — E24.0 PITUITARY-DEPENDENT CUSHING'S DISEASE: ICD-10-CM

## 2025-02-04 PROCEDURE — 99214 OFFICE O/P EST MOD 30 MIN: CPT

## 2025-02-06 ENCOUNTER — APPOINTMENT (OUTPATIENT)
Dept: ENDOCRINOLOGY | Facility: CLINIC | Age: 25
End: 2025-02-06
Payer: MEDICAID

## 2025-02-06 VITALS
HEIGHT: 66.5 IN | BODY MASS INDEX: 21.44 KG/M2 | HEART RATE: 86 BPM | SYSTOLIC BLOOD PRESSURE: 112 MMHG | WEIGHT: 135 LBS | DIASTOLIC BLOOD PRESSURE: 72 MMHG | OXYGEN SATURATION: 99 %

## 2025-02-06 DIAGNOSIS — D35.2 BENIGN NEOPLASM OF PITUITARY GLAND: ICD-10-CM

## 2025-02-06 DIAGNOSIS — E89.3 POSTPROCEDURAL HYPOPITUITARISM: ICD-10-CM

## 2025-02-06 PROCEDURE — G2211 COMPLEX E/M VISIT ADD ON: CPT | Mod: NC

## 2025-02-06 PROCEDURE — 99213 OFFICE O/P EST LOW 20 MIN: CPT

## 2025-02-17 ENCOUNTER — APPOINTMENT (OUTPATIENT)
Dept: OTOLARYNGOLOGY | Facility: CLINIC | Age: 25
End: 2025-02-17

## 2025-04-08 ENCOUNTER — APPOINTMENT (OUTPATIENT)
Dept: OTOLARYNGOLOGY | Facility: CLINIC | Age: 25
End: 2025-04-08
Payer: MEDICAID

## 2025-04-08 VITALS — HEART RATE: 106 BPM | DIASTOLIC BLOOD PRESSURE: 77 MMHG | SYSTOLIC BLOOD PRESSURE: 119 MMHG

## 2025-04-08 VITALS
HEIGHT: 66.5 IN | DIASTOLIC BLOOD PRESSURE: 83 MMHG | HEART RATE: 118 BPM | SYSTOLIC BLOOD PRESSURE: 141 MMHG | WEIGHT: 133 LBS | BODY MASS INDEX: 21.12 KG/M2

## 2025-04-08 DIAGNOSIS — J30.9 ALLERGIC RHINITIS, UNSPECIFIED: ICD-10-CM

## 2025-04-08 DIAGNOSIS — D35.2 BENIGN NEOPLASM OF PITUITARY GLAND: ICD-10-CM

## 2025-04-08 DIAGNOSIS — H69.93 UNSPECIFIED EUSTACHIAN TUBE DISORDER, BILATERAL: ICD-10-CM

## 2025-04-08 PROCEDURE — 31231 NASAL ENDOSCOPY DX: CPT

## 2025-04-08 PROCEDURE — 99213 OFFICE O/P EST LOW 20 MIN: CPT | Mod: 25

## 2025-04-11 ENCOUNTER — APPOINTMENT (OUTPATIENT)
Dept: NEUROLOGY | Facility: CLINIC | Age: 25
End: 2025-04-11

## 2025-04-11 ENCOUNTER — NON-APPOINTMENT (OUTPATIENT)
Age: 25
End: 2025-04-11

## 2025-04-19 ENCOUNTER — NON-APPOINTMENT (OUTPATIENT)
Age: 25
End: 2025-04-19

## 2025-06-09 ENCOUNTER — TRANSCRIPTION ENCOUNTER (OUTPATIENT)
Age: 25
End: 2025-06-09

## 2025-06-12 ENCOUNTER — NON-APPOINTMENT (OUTPATIENT)
Age: 25
End: 2025-06-12

## 2025-06-17 ENCOUNTER — APPOINTMENT (OUTPATIENT)
Dept: ENDOCRINOLOGY | Facility: CLINIC | Age: 25
End: 2025-06-17
Payer: MEDICAID

## 2025-06-17 VITALS
HEART RATE: 88 BPM | HEIGHT: 66.5 IN | SYSTOLIC BLOOD PRESSURE: 118 MMHG | BODY MASS INDEX: 21.6 KG/M2 | WEIGHT: 136 LBS | OXYGEN SATURATION: 99 % | DIASTOLIC BLOOD PRESSURE: 78 MMHG

## 2025-06-17 PROCEDURE — 99214 OFFICE O/P EST MOD 30 MIN: CPT

## 2025-06-26 ENCOUNTER — APPOINTMENT (OUTPATIENT)
Dept: CARDIOLOGY | Facility: CLINIC | Age: 25
End: 2025-06-26
Payer: MEDICAID

## 2025-06-26 ENCOUNTER — APPOINTMENT (OUTPATIENT)
Dept: NEPHROLOGY | Facility: CLINIC | Age: 25
End: 2025-06-26

## 2025-06-26 VITALS
HEART RATE: 85 BPM | RESPIRATION RATE: 12 BRPM | SYSTOLIC BLOOD PRESSURE: 112 MMHG | BODY MASS INDEX: 22.23 KG/M2 | HEIGHT: 66.5 IN | WEIGHT: 140 LBS | DIASTOLIC BLOOD PRESSURE: 70 MMHG | OXYGEN SATURATION: 100 %

## 2025-06-26 VITALS
OXYGEN SATURATION: 99 % | HEART RATE: 86 BPM | BODY MASS INDEX: 21.44 KG/M2 | RESPIRATION RATE: 16 BRPM | WEIGHT: 135 LBS | SYSTOLIC BLOOD PRESSURE: 126 MMHG | HEIGHT: 66.5 IN | DIASTOLIC BLOOD PRESSURE: 82 MMHG

## 2025-06-26 PROCEDURE — 99214 OFFICE O/P EST MOD 30 MIN: CPT

## 2025-06-26 PROCEDURE — 99204 OFFICE O/P NEW MOD 45 MIN: CPT

## 2025-06-26 PROCEDURE — G2211 COMPLEX E/M VISIT ADD ON: CPT | Mod: NC

## 2025-07-23 ENCOUNTER — APPOINTMENT (OUTPATIENT)
Dept: ENDOCRINOLOGY | Facility: CLINIC | Age: 25
End: 2025-07-23

## 2025-07-25 ENCOUNTER — APPOINTMENT (OUTPATIENT)
Dept: CARDIOLOGY | Facility: CLINIC | Age: 25
End: 2025-07-25
Payer: MEDICAID

## 2025-07-25 PROCEDURE — 93306 TTE W/DOPPLER COMPLETE: CPT

## 2025-07-28 ENCOUNTER — APPOINTMENT (OUTPATIENT)
Dept: NEUROLOGY | Facility: CLINIC | Age: 25
End: 2025-07-28

## 2025-07-28 ENCOUNTER — NON-APPOINTMENT (OUTPATIENT)
Age: 25
End: 2025-07-28

## 2025-07-31 ENCOUNTER — APPOINTMENT (OUTPATIENT)
Dept: CARDIOLOGY | Facility: CLINIC | Age: 25
End: 2025-07-31

## 2025-07-31 DIAGNOSIS — E24.0 PITUITARY-DEPENDENT CUSHING'S DISEASE: ICD-10-CM

## 2025-07-31 DIAGNOSIS — I49.49 OTHER PREMATURE DEPOLARIZATION: ICD-10-CM

## 2025-07-31 DIAGNOSIS — E34.8 OTHER SPECIFIED ENDOCRINE DISORDERS: ICD-10-CM

## 2025-07-31 DIAGNOSIS — I95.9 HYPOTENSION, UNSPECIFIED: ICD-10-CM

## 2025-07-31 DIAGNOSIS — I49.1 ATRIAL PREMATURE DEPOLARIZATION: ICD-10-CM

## 2025-07-31 DIAGNOSIS — I47.29 OTHER VENTRICULAR TACHYCARDIA: ICD-10-CM

## 2025-07-31 DIAGNOSIS — R00.2 PALPITATIONS: ICD-10-CM

## 2025-07-31 DIAGNOSIS — E89.3 POSTPROCEDURAL HYPOPITUITARISM: ICD-10-CM

## 2025-08-07 ENCOUNTER — APPOINTMENT (OUTPATIENT)
Dept: CARDIOLOGY | Facility: CLINIC | Age: 25
End: 2025-08-07

## 2025-08-13 ENCOUNTER — APPOINTMENT (OUTPATIENT)
Dept: OTOLARYNGOLOGY | Facility: CLINIC | Age: 25
End: 2025-08-13

## 2025-08-15 ENCOUNTER — APPOINTMENT (OUTPATIENT)
Dept: OTOLARYNGOLOGY | Facility: CLINIC | Age: 25
End: 2025-08-15
Payer: MEDICAID

## 2025-08-15 VITALS
HEIGHT: 67 IN | HEART RATE: 92 BPM | DIASTOLIC BLOOD PRESSURE: 80 MMHG | WEIGHT: 140 LBS | SYSTOLIC BLOOD PRESSURE: 118 MMHG | BODY MASS INDEX: 21.97 KG/M2

## 2025-08-15 DIAGNOSIS — J30.9 ALLERGIC RHINITIS, UNSPECIFIED: ICD-10-CM

## 2025-08-15 DIAGNOSIS — D35.2 BENIGN NEOPLASM OF PITUITARY GLAND: ICD-10-CM

## 2025-08-15 PROCEDURE — 31231 NASAL ENDOSCOPY DX: CPT

## 2025-08-15 PROCEDURE — 99213 OFFICE O/P EST LOW 20 MIN: CPT | Mod: 25

## 2025-08-15 RX ORDER — PROPRANOLOL HYDROCHLORIDE 10 MG/1
10 TABLET ORAL
Refills: 0 | Status: ACTIVE | COMMUNITY

## 2025-08-15 RX ORDER — LORAZEPAM 2 MG/1
TABLET ORAL
Refills: 0 | Status: ACTIVE | COMMUNITY

## 2025-08-15 RX ORDER — ESCITALOPRAM OXALATE 5 MG/1
TABLET, FILM COATED ORAL
Refills: 0 | Status: ACTIVE | COMMUNITY

## 2025-08-15 RX ORDER — METOCLOPRAMIDE HYDROCHLORIDE 10 MG/1
10 TABLET ORAL
Refills: 0 | Status: ACTIVE | COMMUNITY

## 2025-08-16 ENCOUNTER — EMERGENCY (EMERGENCY)
Facility: HOSPITAL | Age: 25
LOS: 1 days | End: 2025-08-16
Attending: STUDENT IN AN ORGANIZED HEALTH CARE EDUCATION/TRAINING PROGRAM
Payer: COMMERCIAL

## 2025-08-16 VITALS
WEIGHT: 145.28 LBS | SYSTOLIC BLOOD PRESSURE: 129 MMHG | DIASTOLIC BLOOD PRESSURE: 87 MMHG | OXYGEN SATURATION: 99 % | RESPIRATION RATE: 16 BRPM | TEMPERATURE: 98 F | HEART RATE: 102 BPM | HEIGHT: 67 IN

## 2025-08-16 VITALS
HEART RATE: 96 BPM | OXYGEN SATURATION: 97 % | TEMPERATURE: 98 F | RESPIRATION RATE: 16 BRPM | SYSTOLIC BLOOD PRESSURE: 113 MMHG | DIASTOLIC BLOOD PRESSURE: 70 MMHG

## 2025-08-16 DIAGNOSIS — Z98.890 OTHER SPECIFIED POSTPROCEDURAL STATES: Chronic | ICD-10-CM

## 2025-08-16 LAB
ANION GAP SERPL CALC-SCNC: 14 MMOL/L — SIGNIFICANT CHANGE UP (ref 5–17)
APTT BLD: 30.5 SEC — SIGNIFICANT CHANGE UP (ref 26.1–36.8)
BASOPHILS # BLD AUTO: 0.05 K/UL — SIGNIFICANT CHANGE UP (ref 0–0.2)
BASOPHILS NFR BLD AUTO: 0.5 % — SIGNIFICANT CHANGE UP (ref 0–2)
BUN SERPL-MCNC: 15.5 MG/DL — SIGNIFICANT CHANGE UP (ref 8–20)
CALCIUM SERPL-MCNC: 9.6 MG/DL — SIGNIFICANT CHANGE UP (ref 8.4–10.5)
CHLORIDE SERPL-SCNC: 103 MMOL/L — SIGNIFICANT CHANGE UP (ref 96–108)
CO2 SERPL-SCNC: 22 MMOL/L — SIGNIFICANT CHANGE UP (ref 22–29)
CREAT SERPL-MCNC: 0.57 MG/DL — SIGNIFICANT CHANGE UP (ref 0.5–1.3)
EGFR: 128 ML/MIN/1.73M2 — SIGNIFICANT CHANGE UP
EGFR: 128 ML/MIN/1.73M2 — SIGNIFICANT CHANGE UP
EOSINOPHIL # BLD AUTO: 0.11 K/UL — SIGNIFICANT CHANGE UP (ref 0–0.5)
EOSINOPHIL NFR BLD AUTO: 1.1 % — SIGNIFICANT CHANGE UP (ref 0–6)
GLUCOSE SERPL-MCNC: 91 MG/DL — SIGNIFICANT CHANGE UP (ref 70–99)
HCT VFR BLD CALC: 38.6 % — SIGNIFICANT CHANGE UP (ref 34.5–45)
HGB BLD-MCNC: 12.6 G/DL — SIGNIFICANT CHANGE UP (ref 11.5–15.5)
IMM GRANULOCYTES # BLD AUTO: 0.03 K/UL — SIGNIFICANT CHANGE UP (ref 0–0.07)
IMM GRANULOCYTES NFR BLD AUTO: 0.3 % — SIGNIFICANT CHANGE UP (ref 0–0.9)
INR BLD: 1.08 RATIO — SIGNIFICANT CHANGE UP (ref 0.85–1.16)
LYMPHOCYTES # BLD AUTO: 1.55 K/UL — SIGNIFICANT CHANGE UP (ref 1–3.3)
LYMPHOCYTES NFR BLD AUTO: 15.9 % — SIGNIFICANT CHANGE UP (ref 13–44)
MCHC RBC-ENTMCNC: 28.6 PG — SIGNIFICANT CHANGE UP (ref 27–34)
MCHC RBC-ENTMCNC: 32.6 G/DL — SIGNIFICANT CHANGE UP (ref 32–36)
MCV RBC AUTO: 87.5 FL — SIGNIFICANT CHANGE UP (ref 80–100)
MONOCYTES # BLD AUTO: 0.56 K/UL — SIGNIFICANT CHANGE UP (ref 0–0.9)
MONOCYTES NFR BLD AUTO: 5.7 % — SIGNIFICANT CHANGE UP (ref 2–14)
NEUTROPHILS # BLD AUTO: 7.46 K/UL — HIGH (ref 1.8–7.4)
NEUTROPHILS NFR BLD AUTO: 76.5 % — SIGNIFICANT CHANGE UP (ref 43–77)
NRBC # BLD AUTO: 0 K/UL — SIGNIFICANT CHANGE UP (ref 0–0)
NRBC # FLD: 0 K/UL — SIGNIFICANT CHANGE UP (ref 0–0)
NRBC BLD AUTO-RTO: 0 /100 WBCS — SIGNIFICANT CHANGE UP (ref 0–0)
PLATELET # BLD AUTO: 246 K/UL — SIGNIFICANT CHANGE UP (ref 150–400)
PMV BLD: 9.6 FL — SIGNIFICANT CHANGE UP (ref 7–13)
POTASSIUM SERPL-MCNC: 4.2 MMOL/L — SIGNIFICANT CHANGE UP (ref 3.5–5.3)
POTASSIUM SERPL-SCNC: 4.2 MMOL/L — SIGNIFICANT CHANGE UP (ref 3.5–5.3)
PROTHROM AB SERPL-ACNC: 12.2 SEC — SIGNIFICANT CHANGE UP (ref 9.9–13.4)
RBC # BLD: 4.41 M/UL — SIGNIFICANT CHANGE UP (ref 3.8–5.2)
RBC # FLD: 12.6 % — SIGNIFICANT CHANGE UP (ref 10.3–14.5)
SODIUM SERPL-SCNC: 139 MMOL/L — SIGNIFICANT CHANGE UP (ref 135–145)
WBC # BLD: 9.76 K/UL — SIGNIFICANT CHANGE UP (ref 3.8–10.5)
WBC # FLD AUTO: 9.76 K/UL — SIGNIFICANT CHANGE UP (ref 3.8–10.5)

## 2025-08-16 PROCEDURE — 96375 TX/PRO/DX INJ NEW DRUG ADDON: CPT

## 2025-08-16 PROCEDURE — 80048 BASIC METABOLIC PNL TOTAL CA: CPT

## 2025-08-16 PROCEDURE — 85025 COMPLETE CBC W/AUTO DIFF WBC: CPT

## 2025-08-16 PROCEDURE — 84702 CHORIONIC GONADOTROPIN TEST: CPT

## 2025-08-16 PROCEDURE — 85730 THROMBOPLASTIN TIME PARTIAL: CPT

## 2025-08-16 PROCEDURE — 36415 COLL VENOUS BLD VENIPUNCTURE: CPT

## 2025-08-16 PROCEDURE — 99284 EMERGENCY DEPT VISIT MOD MDM: CPT

## 2025-08-16 PROCEDURE — 99284 EMERGENCY DEPT VISIT MOD MDM: CPT | Mod: 25

## 2025-08-16 PROCEDURE — 96374 THER/PROPH/DIAG INJ IV PUSH: CPT

## 2025-08-16 PROCEDURE — 85610 PROTHROMBIN TIME: CPT

## 2025-08-16 RX ORDER — BUTALBITAL, ACETAMINOPHEN AND CAFFEINE 50; 325; 40 MG/1; MG/1; MG/1
1 TABLET ORAL
Qty: 30 | Refills: 0
Start: 2025-08-16

## 2025-08-16 RX ORDER — METOCLOPRAMIDE HCL 10 MG
10 TABLET ORAL ONCE
Refills: 0 | Status: COMPLETED | OUTPATIENT
Start: 2025-08-16 | End: 2025-08-16

## 2025-08-16 RX ORDER — ACETAMINOPHEN 500 MG/5ML
1000 LIQUID (ML) ORAL ONCE
Refills: 0 | Status: COMPLETED | OUTPATIENT
Start: 2025-08-16 | End: 2025-08-16

## 2025-08-16 RX ADMIN — Medication 400 MILLIGRAM(S): at 16:45

## 2025-08-16 RX ADMIN — Medication 10 MILLIGRAM(S): at 16:45

## 2025-08-16 RX ADMIN — Medication 1000 MILLILITER(S): at 16:45

## 2025-08-18 ENCOUNTER — APPOINTMENT (OUTPATIENT)
Dept: ENDOCRINOLOGY | Facility: CLINIC | Age: 25
End: 2025-08-18

## 2025-08-19 DIAGNOSIS — R51.9 HEADACHE, UNSPECIFIED: ICD-10-CM

## 2025-09-05 RX ORDER — BUDESONIDE 0.5 MG/2ML
0.5 INHALANT ORAL
Qty: 120 | Refills: 2 | Status: ACTIVE | COMMUNITY
Start: 2025-09-05 | End: 1900-01-01

## 2025-09-09 ENCOUNTER — NON-APPOINTMENT (OUTPATIENT)
Age: 25
End: 2025-09-09

## 2025-09-09 ENCOUNTER — APPOINTMENT (OUTPATIENT)
Dept: NEUROLOGY | Facility: CLINIC | Age: 25
End: 2025-09-09
Payer: MEDICAID

## 2025-09-09 VITALS
BODY MASS INDEX: 21.97 KG/M2 | OXYGEN SATURATION: 99 % | HEIGHT: 67 IN | HEART RATE: 102 BPM | SYSTOLIC BLOOD PRESSURE: 100 MMHG | WEIGHT: 140 LBS | DIASTOLIC BLOOD PRESSURE: 62 MMHG

## 2025-09-09 DIAGNOSIS — R51.9 HEADACHE, UNSPECIFIED: ICD-10-CM

## 2025-09-09 PROCEDURE — 99204 OFFICE O/P NEW MOD 45 MIN: CPT

## 2025-09-09 RX ORDER — ATOGEPANT 10 MG/1
10 TABLET ORAL
Refills: 0 | Status: ACTIVE | COMMUNITY

## 2025-09-09 RX ORDER — ATOGEPANT 30 MG/1
30 TABLET ORAL
Refills: 0 | Status: ACTIVE | COMMUNITY

## 2025-09-10 ENCOUNTER — APPOINTMENT (OUTPATIENT)
Dept: NEUROSURGERY | Facility: CLINIC | Age: 25
End: 2025-09-10

## 2025-09-10 VITALS
DIASTOLIC BLOOD PRESSURE: 83 MMHG | SYSTOLIC BLOOD PRESSURE: 129 MMHG | OXYGEN SATURATION: 100 % | BODY MASS INDEX: 21.97 KG/M2 | WEIGHT: 140 LBS | HEART RATE: 85 BPM | HEIGHT: 67 IN | RESPIRATION RATE: 14 BRPM

## 2025-09-10 DIAGNOSIS — M54.2 CERVICALGIA: ICD-10-CM

## 2025-09-12 ENCOUNTER — RX RENEWAL (OUTPATIENT)
Age: 25
End: 2025-09-12

## 2025-09-15 ENCOUNTER — APPOINTMENT (OUTPATIENT)
Dept: NEUROLOGY | Facility: CLINIC | Age: 25
End: 2025-09-15
Payer: MEDICAID

## 2025-09-15 VITALS
WEIGHT: 140 LBS | DIASTOLIC BLOOD PRESSURE: 54 MMHG | BODY MASS INDEX: 21.97 KG/M2 | HEIGHT: 67 IN | SYSTOLIC BLOOD PRESSURE: 108 MMHG

## 2025-09-15 DIAGNOSIS — G43.011 MIGRAINE W/OUT AURA, INTRACTABLE, WITH STATUS MIGRAINOSUS: ICD-10-CM

## 2025-09-15 PROCEDURE — 99215 OFFICE O/P EST HI 40 MIN: CPT

## 2025-09-19 RX ORDER — NABUMETONE 500 MG/1
500 TABLET, FILM COATED ORAL
Qty: 12 | Refills: 0 | Status: ACTIVE | COMMUNITY
Start: 2025-09-19 | End: 1900-01-01

## (undated) DEVICE — MIDAS REX MR8 CLEARVIEW TN BALL 3MM X 13CM COARSE

## (undated) DEVICE — SPECIMEN CONTAINER 100ML

## (undated) DEVICE — WARMING BLANKET LOWER ADULT

## (undated) DEVICE — GLV 8 PROTEXIS (WHITE)

## (undated) DEVICE — SOL IRR POUR NS 0.9% 1000ML

## (undated) DEVICE — BLADE MEDTRONIC ENT TRICUT ROTATABLE STRAIGHT 4MM X 11CM

## (undated) DEVICE — Device

## (undated) DEVICE — DRAPE 1/2 SHEET 40X57"

## (undated) DEVICE — GOWN TRIMAX LG

## (undated) DEVICE — MEDICATION LABELS W MARKER

## (undated) DEVICE — VENODYNE/SCD SLEEVE CALF LARGE

## (undated) DEVICE — DRSG NASOPORE 8CM FIRM

## (undated) DEVICE — BIPOLAR FORCEP SYMMETRY BAYONET 7" X 1.5MM SMOOTH (SILVER)

## (undated) DEVICE — SYR LUER LOK 10CC

## (undated) DEVICE — POSITIONER FOAM EGG CRATE ULNAR 2PCS (PINK)

## (undated) DEVICE — FOLEY TRAY 16FR 5CC LTX UMETER CLOSED

## (undated) DEVICE — CLEANING SHEATH ENDO-SCRUB FOR STORZ 7210AA TELESCOPE 4MM 0 DEGREE

## (undated) DEVICE — PACK NEURO

## (undated) DEVICE — STORZ DURA MICRO KNIFE INSERT SICKLE-SHAPED

## (undated) DEVICE — DRAPE SPLIT SHEET 77" X 108"

## (undated) DEVICE — DRAPE 3/4 SHEET W REINFORCEMENT 56X77"

## (undated) DEVICE — DRAPE INSTRUMENT POUCH 6.75" X 11"

## (undated) DEVICE — DRSG 4 X 8

## (undated) DEVICE — SOL IRR POUR H2O 250ML

## (undated) DEVICE — MEDTRONIC INSTRUMENT TRACKER ENT

## (undated) DEVICE — ELCTR ROCKER SWITCH PENCIL BLUE 10FT

## (undated) DEVICE — DRSG TELFA 3 X 8

## (undated) DEVICE — MEDTRONIC AXIEM PATIENT TRACKER NON-INVASIVE

## (undated) DEVICE — MARKING PEN W RULER

## (undated) DEVICE — BUR MEDTRONIC ENT TRANSNASAL SKULL BASE DIAMOND ROUND 15 DEGREE 4.5MM X 13CM

## (undated) DEVICE — TUBING SUCTION 20FT

## (undated) DEVICE — STAPLER SKIN PROXIMATE

## (undated) DEVICE — ELCTR BOVIE TIP NEEDLE INSULATED 2.8" EDGE

## (undated) DEVICE — DRSG MEROCEL STANDARD NO STRING 8CM X 2CM